# Patient Record
Sex: MALE | Race: WHITE | Employment: FULL TIME | URBAN - METROPOLITAN AREA
[De-identification: names, ages, dates, MRNs, and addresses within clinical notes are randomized per-mention and may not be internally consistent; named-entity substitution may affect disease eponyms.]

---

## 2018-06-21 ENCOUNTER — OFFICE VISIT (OUTPATIENT)
Dept: FAMILY MEDICINE CLINIC | Age: 58
End: 2018-06-21
Payer: COMMERCIAL

## 2018-06-21 VITALS
TEMPERATURE: 97.7 F | WEIGHT: 229.2 LBS | RESPIRATION RATE: 16 BRPM | HEIGHT: 70 IN | BODY MASS INDEX: 32.81 KG/M2 | HEART RATE: 60 BPM | DIASTOLIC BLOOD PRESSURE: 100 MMHG | SYSTOLIC BLOOD PRESSURE: 182 MMHG

## 2018-06-21 DIAGNOSIS — Z12.5 SCREENING PSA (PROSTATE SPECIFIC ANTIGEN): ICD-10-CM

## 2018-06-21 DIAGNOSIS — Z76.89 ESTABLISHING CARE WITH NEW DOCTOR, ENCOUNTER FOR: ICD-10-CM

## 2018-06-21 DIAGNOSIS — I10 HYPERTENSION, UNSPECIFIED TYPE: ICD-10-CM

## 2018-06-21 DIAGNOSIS — Z76.89 ESTABLISHING CARE WITH NEW DOCTOR, ENCOUNTER FOR: Primary | ICD-10-CM

## 2018-06-21 LAB
ALBUMIN SERPL-MCNC: 4.3 G/DL (ref 3.9–4.9)
ALP BLD-CCNC: 94 U/L (ref 35–104)
ALT SERPL-CCNC: 54 U/L (ref 0–41)
ANION GAP SERPL CALCULATED.3IONS-SCNC: 16 MEQ/L (ref 7–13)
AST SERPL-CCNC: 32 U/L (ref 0–40)
BASOPHILS ABSOLUTE: 0.1 K/UL (ref 0–0.2)
BASOPHILS RELATIVE PERCENT: 1.1 %
BILIRUB SERPL-MCNC: 1.2 MG/DL (ref 0–1.2)
BUN BLDV-MCNC: 18 MG/DL (ref 6–20)
CALCIUM SERPL-MCNC: 9.4 MG/DL (ref 8.6–10.2)
CHLORIDE BLD-SCNC: 99 MEQ/L (ref 98–107)
CHOLESTEROL, TOTAL: 199 MG/DL (ref 0–199)
CO2: 26 MEQ/L (ref 22–29)
CREAT SERPL-MCNC: 0.87 MG/DL (ref 0.7–1.2)
EOSINOPHILS ABSOLUTE: 0.4 K/UL (ref 0–0.7)
EOSINOPHILS RELATIVE PERCENT: 5.7 %
GFR AFRICAN AMERICAN: >60
GFR NON-AFRICAN AMERICAN: >60
GLOBULIN: 2.6 G/DL (ref 2.3–3.5)
GLUCOSE BLD-MCNC: 207 MG/DL (ref 74–109)
HCT VFR BLD CALC: 45.8 % (ref 42–52)
HDLC SERPL-MCNC: 35 MG/DL (ref 40–59)
HEMOGLOBIN: 16.1 G/DL (ref 14–18)
LDL CHOLESTEROL CALCULATED: 116 MG/DL (ref 0–129)
LYMPHOCYTES ABSOLUTE: 3 K/UL (ref 1–4.8)
LYMPHOCYTES RELATIVE PERCENT: 39.2 %
MCH RBC QN AUTO: 30.8 PG (ref 27–31.3)
MCHC RBC AUTO-ENTMCNC: 35.1 % (ref 33–37)
MCV RBC AUTO: 87.8 FL (ref 80–100)
MONOCYTES ABSOLUTE: 0.6 K/UL (ref 0.2–0.8)
MONOCYTES RELATIVE PERCENT: 7.9 %
NEUTROPHILS ABSOLUTE: 3.6 K/UL (ref 1.4–6.5)
NEUTROPHILS RELATIVE PERCENT: 46.1 %
PDW BLD-RTO: 13.2 % (ref 11.5–14.5)
PLATELET # BLD: 139 K/UL (ref 130–400)
POTASSIUM SERPL-SCNC: 4 MEQ/L (ref 3.5–5.1)
PROSTATE SPECIFIC ANTIGEN: 0.44 NG/ML (ref 0–3.89)
RBC # BLD: 5.22 M/UL (ref 4.7–6.1)
SODIUM BLD-SCNC: 141 MEQ/L (ref 132–144)
TOTAL PROTEIN: 6.9 G/DL (ref 6.4–8.1)
TRIGL SERPL-MCNC: 242 MG/DL (ref 0–200)
WBC # BLD: 7.7 K/UL (ref 4.8–10.8)

## 2018-06-21 PROCEDURE — 93000 ELECTROCARDIOGRAM COMPLETE: CPT | Performed by: FAMILY MEDICINE

## 2018-06-21 PROCEDURE — G8427 DOCREV CUR MEDS BY ELIG CLIN: HCPCS | Performed by: FAMILY MEDICINE

## 2018-06-21 PROCEDURE — 3017F COLORECTAL CA SCREEN DOC REV: CPT | Performed by: FAMILY MEDICINE

## 2018-06-21 PROCEDURE — 1036F TOBACCO NON-USER: CPT | Performed by: FAMILY MEDICINE

## 2018-06-21 PROCEDURE — 99203 OFFICE O/P NEW LOW 30 MIN: CPT | Performed by: FAMILY MEDICINE

## 2018-06-21 PROCEDURE — G8417 CALC BMI ABV UP PARAM F/U: HCPCS | Performed by: FAMILY MEDICINE

## 2018-06-21 RX ORDER — OLMESARTAN MEDOXOMIL AND HYDROCHLOROTHIAZIDE 20/12.5 20; 12.5 MG/1; MG/1
1 TABLET ORAL DAILY
Qty: 30 TABLET | Refills: 3 | Status: SHIPPED | OUTPATIENT
Start: 2018-06-21 | End: 2018-07-05

## 2018-06-21 RX ORDER — METOPROLOL TARTRATE 50 MG/1
50 TABLET, FILM COATED ORAL 2 TIMES DAILY
Qty: 60 TABLET | Refills: 3 | Status: SHIPPED | OUTPATIENT
Start: 2018-06-21 | End: 2018-10-15 | Stop reason: SDUPTHER

## 2018-06-21 ASSESSMENT — ENCOUNTER SYMPTOMS
CONSTIPATION: 0
EYES NEGATIVE: 1
NAUSEA: 0
SHORTNESS OF BREATH: 0
DIARRHEA: 0
COUGH: 0
ABDOMINAL PAIN: 0

## 2018-06-21 ASSESSMENT — PATIENT HEALTH QUESTIONNAIRE - PHQ9
1. LITTLE INTEREST OR PLEASURE IN DOING THINGS: 0
SUM OF ALL RESPONSES TO PHQ QUESTIONS 1-9: 0
2. FEELING DOWN, DEPRESSED OR HOPELESS: 0
SUM OF ALL RESPONSES TO PHQ9 QUESTIONS 1 & 2: 0

## 2018-06-22 DIAGNOSIS — R73.9 ELEVATED BLOOD SUGAR: Primary | ICD-10-CM

## 2018-06-22 LAB — HBA1C MFR BLD: 8.9 % (ref 4.8–5.9)

## 2018-07-03 ENCOUNTER — OFFICE VISIT (OUTPATIENT)
Dept: FAMILY MEDICINE CLINIC | Age: 58
End: 2018-07-03
Payer: COMMERCIAL

## 2018-07-03 VITALS
DIASTOLIC BLOOD PRESSURE: 88 MMHG | HEIGHT: 70 IN | RESPIRATION RATE: 20 BRPM | BODY MASS INDEX: 32.78 KG/M2 | SYSTOLIC BLOOD PRESSURE: 158 MMHG | WEIGHT: 229 LBS | TEMPERATURE: 96.9 F | HEART RATE: 64 BPM

## 2018-07-03 DIAGNOSIS — L91.8 SKIN TAG: ICD-10-CM

## 2018-07-03 DIAGNOSIS — L91.8 SKIN TAG: Primary | ICD-10-CM

## 2018-07-03 PROCEDURE — 11310 SHAVE SKIN LESION 0.5 CM/<: CPT | Performed by: FAMILY MEDICINE

## 2018-07-03 ASSESSMENT — ENCOUNTER SYMPTOMS
EYE DISCHARGE: 0
SINUS PRESSURE: 0
CHEST TIGHTNESS: 0
EYE ITCHING: 0
SHORTNESS OF BREATH: 0
ABDOMINAL PAIN: 0
SORE THROAT: 0
DIARRHEA: 0
CONSTIPATION: 0

## 2018-07-03 NOTE — PROGRESS NOTES
Subjective  Fifi Fitting, 65 y.o. male presents today with:  Chief Complaint   Patient presents with    Skin Tag     Pt here for forehead skin tag removal. States was told to return after last visit. Pt states tag is not sore or tender, but does itch. HPI    Patient here for lesion removal of the left thigh. No other questions and or concerns for today's visit      Review of Systems   Constitutional: Negative for appetite change, fatigue and fever. HENT: Negative for congestion, ear pain, sinus pressure and sore throat. Eyes: Negative for discharge and itching. Respiratory: Negative for chest tightness and shortness of breath. Cardiovascular: Negative for chest pain and palpitations. Gastrointestinal: Negative for abdominal pain, constipation and diarrhea. Endocrine: Negative for polydipsia. Genitourinary: Negative for difficulty urinating. Musculoskeletal: Negative for gait problem. Skin: Negative. Forehead skin tag   Neurological: Negative for dizziness, weakness, light-headedness and headaches. Hematological: Negative. Psychiatric/Behavioral: Negative. Negative for sleep disturbance. The patient is not nervous/anxious. Past Medical History:   Diagnosis Date    Hypertension      Past Surgical History:   Procedure Laterality Date    ANTERIOR CRUCIATE LIGAMENT REPAIR Left 03/01/1993    CYST REMOVAL  01/01/1983    GALLBLADDER SURGERY  01/01/2010     Social History     Social History    Marital status:      Spouse name: N/A    Number of children: N/A    Years of education: N/A     Occupational History    Not on file.      Social History Main Topics    Smoking status: Never Smoker    Smokeless tobacco: Never Used    Alcohol use No    Drug use: No    Sexual activity: Not on file     Other Topics Concern    Not on file     Social History Narrative    No narrative on file     Family History   Problem Relation Age of Onset    Lung Cancer

## 2018-07-05 ENCOUNTER — OFFICE VISIT (OUTPATIENT)
Dept: FAMILY MEDICINE CLINIC | Age: 58
End: 2018-07-05
Payer: COMMERCIAL

## 2018-07-05 VITALS
HEIGHT: 70 IN | RESPIRATION RATE: 18 BRPM | HEART RATE: 80 BPM | DIASTOLIC BLOOD PRESSURE: 88 MMHG | WEIGHT: 228 LBS | SYSTOLIC BLOOD PRESSURE: 148 MMHG | TEMPERATURE: 97.2 F | BODY MASS INDEX: 32.64 KG/M2

## 2018-07-05 DIAGNOSIS — L91.8 SKIN TAG: Primary | ICD-10-CM

## 2018-07-05 DIAGNOSIS — E11.9 TYPE 2 DIABETES MELLITUS WITHOUT COMPLICATION, WITHOUT LONG-TERM CURRENT USE OF INSULIN (HCC): ICD-10-CM

## 2018-07-05 DIAGNOSIS — I10 HYPERTENSION, UNSPECIFIED TYPE: ICD-10-CM

## 2018-07-05 PROCEDURE — G8427 DOCREV CUR MEDS BY ELIG CLIN: HCPCS | Performed by: FAMILY MEDICINE

## 2018-07-05 PROCEDURE — 1036F TOBACCO NON-USER: CPT | Performed by: FAMILY MEDICINE

## 2018-07-05 PROCEDURE — G8417 CALC BMI ABV UP PARAM F/U: HCPCS | Performed by: FAMILY MEDICINE

## 2018-07-05 PROCEDURE — 2022F DILAT RTA XM EVC RTNOPTHY: CPT | Performed by: FAMILY MEDICINE

## 2018-07-05 PROCEDURE — 99214 OFFICE O/P EST MOD 30 MIN: CPT | Performed by: FAMILY MEDICINE

## 2018-07-05 PROCEDURE — 3045F PR MOST RECENT HEMOGLOBIN A1C LEVEL 7.0-9.0%: CPT | Performed by: FAMILY MEDICINE

## 2018-07-05 PROCEDURE — 3017F COLORECTAL CA SCREEN DOC REV: CPT | Performed by: FAMILY MEDICINE

## 2018-07-05 RX ORDER — OLMESARTAN MEDOXOMIL AND HYDROCHLOROTHIAZIDE 40/12.5 40; 12.5 MG/1; MG/1
1 TABLET ORAL DAILY
Qty: 30 TABLET | Refills: 3 | Status: SHIPPED | OUTPATIENT
Start: 2018-07-05 | End: 2018-10-15 | Stop reason: SDUPTHER

## 2018-07-05 ASSESSMENT — ENCOUNTER SYMPTOMS
SHORTNESS OF BREATH: 0
SINUS PRESSURE: 0
EYE DISCHARGE: 0
CONSTIPATION: 0
ABDOMINAL PAIN: 0
CHEST TIGHTNESS: 0
EYE ITCHING: 0
SORE THROAT: 0
DIARRHEA: 0

## 2018-08-02 ENCOUNTER — OFFICE VISIT (OUTPATIENT)
Dept: FAMILY MEDICINE CLINIC | Age: 58
End: 2018-08-02
Payer: COMMERCIAL

## 2018-08-02 VITALS
WEIGHT: 221 LBS | HEART RATE: 65 BPM | RESPIRATION RATE: 20 BRPM | TEMPERATURE: 97.3 F | HEIGHT: 70 IN | SYSTOLIC BLOOD PRESSURE: 128 MMHG | BODY MASS INDEX: 31.64 KG/M2 | DIASTOLIC BLOOD PRESSURE: 82 MMHG

## 2018-08-02 DIAGNOSIS — E11.8 TYPE 2 DIABETES MELLITUS WITH COMPLICATION, WITHOUT LONG-TERM CURRENT USE OF INSULIN (HCC): ICD-10-CM

## 2018-08-02 DIAGNOSIS — I10 HYPERTENSION, UNSPECIFIED TYPE: Primary | ICD-10-CM

## 2018-08-02 LAB
CREATININE URINE: 229.1 MG/DL
HBA1C MFR BLD: 6.8 %
MICROALBUMIN UR-MCNC: 9.1 MG/DL
MICROALBUMIN/CREAT UR-RTO: 39.7 MG/G (ref 0–30)

## 2018-08-02 PROCEDURE — G8417 CALC BMI ABV UP PARAM F/U: HCPCS | Performed by: FAMILY MEDICINE

## 2018-08-02 PROCEDURE — 2022F DILAT RTA XM EVC RTNOPTHY: CPT | Performed by: FAMILY MEDICINE

## 2018-08-02 PROCEDURE — 1036F TOBACCO NON-USER: CPT | Performed by: FAMILY MEDICINE

## 2018-08-02 PROCEDURE — 83036 HEMOGLOBIN GLYCOSYLATED A1C: CPT | Performed by: FAMILY MEDICINE

## 2018-08-02 PROCEDURE — 99213 OFFICE O/P EST LOW 20 MIN: CPT | Performed by: FAMILY MEDICINE

## 2018-08-02 PROCEDURE — 3044F HG A1C LEVEL LT 7.0%: CPT | Performed by: FAMILY MEDICINE

## 2018-08-02 PROCEDURE — 3017F COLORECTAL CA SCREEN DOC REV: CPT | Performed by: FAMILY MEDICINE

## 2018-08-02 PROCEDURE — G8427 DOCREV CUR MEDS BY ELIG CLIN: HCPCS | Performed by: FAMILY MEDICINE

## 2018-08-02 ASSESSMENT — PATIENT HEALTH QUESTIONNAIRE - PHQ9
SUM OF ALL RESPONSES TO PHQ QUESTIONS 1-9: 0
2. FEELING DOWN, DEPRESSED OR HOPELESS: 0
1. LITTLE INTEREST OR PLEASURE IN DOING THINGS: 0
SUM OF ALL RESPONSES TO PHQ9 QUESTIONS 1 & 2: 0

## 2018-08-02 ASSESSMENT — ENCOUNTER SYMPTOMS
COUGH: 0
SHORTNESS OF BREATH: 0
SINUS PRESSURE: 0
ABDOMINAL PAIN: 0
DIARRHEA: 0
EYE DISCHARGE: 0
EYE ITCHING: 0
CONSTIPATION: 0
SORE THROAT: 0

## 2018-08-02 NOTE — PROGRESS NOTES
Subjective  Asif Mendes, 65 y.o. male presents today with:  Chief Complaint   Patient presents with    Skin Tag     Patient in office being seen for a follow up skin tag removal done 07/03/18, lt upper eyelid. States it healed very well, \" its gone\"    Hypertension     He would like to follow up HTN. Last ov his BP medication was changed to Benicar HCT 40-12.5 mg 1 qd, no negative side effects reported @ this time           HPI    Sherl Cleverly for follow-up blood pressure after changing medications also to look at his eyelid after removal of the skin lesion. No other questions and or concerns for today's visit      Review of Systems   Constitutional: Negative for appetite change, fatigue and fever. HENT: Negative for congestion, ear pain, sinus pressure and sore throat. Eyes: Negative for discharge and itching. Respiratory: Negative for cough and shortness of breath. Cardiovascular: Negative for chest pain and palpitations. Gastrointestinal: Negative for abdominal pain, constipation and diarrhea. Endocrine: Negative for polydipsia. Genitourinary: Negative for difficulty urinating. Musculoskeletal: Negative for gait problem. Skin: Negative. Neurological: Negative for dizziness. Hematological: Negative. Psychiatric/Behavioral: Negative. Past Medical History:   Diagnosis Date    Hypertension     [facility] 4/7/4244     Past Surgical History:   Procedure Laterality Date    ANTERIOR CRUCIATE LIGAMENT REPAIR Left 03/01/1993    CYST REMOVAL  01/01/1983    GALLBLADDER SURGERY  01/01/2010     Social History     Social History    Marital status:      Spouse name: N/A    Number of children: N/A    Years of education: N/A     Occupational History    Not on file.      Social History Main Topics    Smoking status: Never Smoker    Smokeless tobacco: Never Used    Alcohol use No    Drug use: No    Sexual activity: Not on file Other Topics Concern    Not on file     Social History Narrative    No narrative on file     Family History   Problem Relation Age of Onset    Lung Cancer Mother     Heart Disease Father     Dementia Father     Mental Illness Sister      Allergies   Allergen Reactions    Sulfa Antibiotics Rash     Current Outpatient Prescriptions   Medication Sig Dispense Refill    olmesartan-hydrochlorothiazide (BENICAR HCT) 40-12.5 MG per tablet Take 1 tablet by mouth daily 30 tablet 3    metFORMIN (GLUCOPHAGE) 500 MG tablet Take 1 tablet by mouth 2 times daily (with meals) 60 tablet 3    metoprolol tartrate (LOPRESSOR) 50 MG tablet Take 1 tablet by mouth 2 times daily 60 tablet 3     No current facility-administered medications for this visit. PMH, Surgical Hx, Family Hx, and Social Hx reviewed and updated. Health Maintenance reviewed. Objective    Vitals:    08/02/18 0846   BP: 128/82   Pulse: 65   Resp: 20   Temp: 97.3 °F (36.3 °C)   TempSrc: Temporal   Weight: 221 lb (100.2 kg)   Height: 5' 10\" (1.778 m)       Physical Exam   Constitutional: He is oriented to person, place, and time. He appears well-developed and well-nourished. HENT:   Head: Normocephalic and atraumatic. Right Ear: External ear normal.   Left Ear: External ear normal.   Mouth/Throat: Oropharynx is clear and moist.   Eyes: Conjunctivae and EOM are normal. Pupils are equal, round, and reactive to light. Neck: Normal range of motion. Neck supple. No JVD present. No thyromegaly present. Cardiovascular: Normal rate, regular rhythm, normal heart sounds and intact distal pulses. Exam reveals no gallop and no friction rub. No murmur heard. Pulmonary/Chest: Effort normal and breath sounds normal. No respiratory distress. Abdominal: Bowel sounds are normal. He exhibits no mass. There is no tenderness. Musculoskeletal: Normal range of motion. Neurological: He is alert and oriented to person, place, and time.    Skin: Skin is warm

## 2018-10-15 RX ORDER — OLMESARTAN MEDOXOMIL AND HYDROCHLOROTHIAZIDE 40/12.5 40; 12.5 MG/1; MG/1
1 TABLET ORAL DAILY
Qty: 90 TABLET | Refills: 0 | Status: SHIPPED | OUTPATIENT
Start: 2018-10-15 | End: 2019-02-04 | Stop reason: SDUPTHER

## 2018-10-15 RX ORDER — METOPROLOL TARTRATE 50 MG/1
50 TABLET, FILM COATED ORAL 2 TIMES DAILY
Qty: 180 TABLET | Refills: 0 | Status: SHIPPED | OUTPATIENT
Start: 2018-10-15 | End: 2019-02-04 | Stop reason: SDUPTHER

## 2018-11-05 ENCOUNTER — TELEPHONE (OUTPATIENT)
Dept: FAMILY MEDICINE CLINIC | Age: 58
End: 2018-11-05

## 2018-11-06 ENCOUNTER — OFFICE VISIT (OUTPATIENT)
Dept: FAMILY MEDICINE CLINIC | Age: 58
End: 2018-11-06
Payer: COMMERCIAL

## 2018-11-06 VITALS
HEIGHT: 70 IN | WEIGHT: 220.6 LBS | BODY MASS INDEX: 31.58 KG/M2 | TEMPERATURE: 97.9 F | HEART RATE: 58 BPM | SYSTOLIC BLOOD PRESSURE: 138 MMHG | RESPIRATION RATE: 20 BRPM | DIASTOLIC BLOOD PRESSURE: 78 MMHG

## 2018-11-06 DIAGNOSIS — J02.9 SORE THROAT: ICD-10-CM

## 2018-11-06 DIAGNOSIS — R22.1 SWOLLEN THROAT: ICD-10-CM

## 2018-11-06 DIAGNOSIS — R19.7 DIARRHEA, UNSPECIFIED TYPE: Primary | ICD-10-CM

## 2018-11-06 PROCEDURE — G8417 CALC BMI ABV UP PARAM F/U: HCPCS | Performed by: FAMILY MEDICINE

## 2018-11-06 PROCEDURE — 1036F TOBACCO NON-USER: CPT | Performed by: FAMILY MEDICINE

## 2018-11-06 PROCEDURE — G8427 DOCREV CUR MEDS BY ELIG CLIN: HCPCS | Performed by: FAMILY MEDICINE

## 2018-11-06 PROCEDURE — 99213 OFFICE O/P EST LOW 20 MIN: CPT | Performed by: FAMILY MEDICINE

## 2018-11-06 PROCEDURE — G8484 FLU IMMUNIZE NO ADMIN: HCPCS | Performed by: FAMILY MEDICINE

## 2018-11-06 PROCEDURE — 3017F COLORECTAL CA SCREEN DOC REV: CPT | Performed by: FAMILY MEDICINE

## 2018-11-06 ASSESSMENT — ENCOUNTER SYMPTOMS
SHORTNESS OF BREATH: 0
NAUSEA: 0
COUGH: 0
ABDOMINAL PAIN: 1
EYES NEGATIVE: 1
DIARRHEA: 1
CONSTIPATION: 0

## 2019-01-30 ENCOUNTER — OFFICE VISIT (OUTPATIENT)
Dept: FAMILY MEDICINE CLINIC | Age: 59
End: 2019-01-30
Payer: COMMERCIAL

## 2019-01-30 VITALS
WEIGHT: 229 LBS | TEMPERATURE: 96.7 F | SYSTOLIC BLOOD PRESSURE: 156 MMHG | BODY MASS INDEX: 32.78 KG/M2 | DIASTOLIC BLOOD PRESSURE: 96 MMHG | HEIGHT: 70 IN | RESPIRATION RATE: 16 BRPM | HEART RATE: 66 BPM

## 2019-01-30 DIAGNOSIS — M25.561 RIGHT KNEE PAIN, UNSPECIFIED CHRONICITY: ICD-10-CM

## 2019-01-30 DIAGNOSIS — M25.562 LEFT KNEE PAIN, UNSPECIFIED CHRONICITY: ICD-10-CM

## 2019-01-30 DIAGNOSIS — I10 HYPERTENSION, UNSPECIFIED TYPE: Primary | ICD-10-CM

## 2019-01-30 PROCEDURE — 99213 OFFICE O/P EST LOW 20 MIN: CPT | Performed by: FAMILY MEDICINE

## 2019-01-30 PROCEDURE — 20610 DRAIN/INJ JOINT/BURSA W/O US: CPT | Performed by: FAMILY MEDICINE

## 2019-01-30 RX ORDER — TRIAMCINOLONE ACETONIDE 40 MG/ML
80 INJECTION, SUSPENSION INTRA-ARTICULAR; INTRAMUSCULAR ONCE
Status: COMPLETED | OUTPATIENT
Start: 2019-01-30 | End: 2019-01-30

## 2019-01-30 RX ADMIN — TRIAMCINOLONE ACETONIDE 80 MG: 40 INJECTION, SUSPENSION INTRA-ARTICULAR; INTRAMUSCULAR at 10:30

## 2019-01-30 ASSESSMENT — ENCOUNTER SYMPTOMS
SINUS PRESSURE: 0
DIARRHEA: 0
EYE ITCHING: 0
COUGH: 0
SORE THROAT: 0
CONSTIPATION: 0
SHORTNESS OF BREATH: 0
ABDOMINAL PAIN: 0
EYE DISCHARGE: 0

## 2019-01-30 ASSESSMENT — PATIENT HEALTH QUESTIONNAIRE - PHQ9
SUM OF ALL RESPONSES TO PHQ9 QUESTIONS 1 & 2: 0
2. FEELING DOWN, DEPRESSED OR HOPELESS: 0
SUM OF ALL RESPONSES TO PHQ QUESTIONS 1-9: 0
1. LITTLE INTEREST OR PLEASURE IN DOING THINGS: 0
SUM OF ALL RESPONSES TO PHQ QUESTIONS 1-9: 0

## 2019-02-04 DIAGNOSIS — E11.8 TYPE 2 DIABETES MELLITUS WITH COMPLICATION, WITHOUT LONG-TERM CURRENT USE OF INSULIN (HCC): Primary | ICD-10-CM

## 2019-02-04 DIAGNOSIS — I10 HYPERTENSION, UNSPECIFIED TYPE: ICD-10-CM

## 2019-02-04 RX ORDER — METOPROLOL TARTRATE 50 MG/1
TABLET, FILM COATED ORAL
Qty: 60 TABLET | Refills: 0 | Status: SHIPPED | OUTPATIENT
Start: 2019-02-04 | End: 2019-02-15 | Stop reason: SDUPTHER

## 2019-02-04 RX ORDER — OLMESARTAN MEDOXOMIL AND HYDROCHLOROTHIAZIDE 40/12.5 40; 12.5 MG/1; MG/1
TABLET ORAL
Qty: 30 TABLET | Refills: 0 | Status: SHIPPED | OUTPATIENT
Start: 2019-02-04 | End: 2019-02-15 | Stop reason: SDUPTHER

## 2019-02-15 ENCOUNTER — OFFICE VISIT (OUTPATIENT)
Dept: FAMILY MEDICINE CLINIC | Age: 59
End: 2019-02-15
Payer: COMMERCIAL

## 2019-02-15 VITALS
HEART RATE: 70 BPM | RESPIRATION RATE: 12 BRPM | BODY MASS INDEX: 33.39 KG/M2 | WEIGHT: 233.2 LBS | OXYGEN SATURATION: 94 % | HEIGHT: 70 IN | SYSTOLIC BLOOD PRESSURE: 132 MMHG | DIASTOLIC BLOOD PRESSURE: 82 MMHG | TEMPERATURE: 97.4 F

## 2019-02-15 DIAGNOSIS — M25.561 RIGHT KNEE PAIN, UNSPECIFIED CHRONICITY: ICD-10-CM

## 2019-02-15 DIAGNOSIS — I10 HYPERTENSION, UNSPECIFIED TYPE: ICD-10-CM

## 2019-02-15 DIAGNOSIS — M25.562 LEFT KNEE PAIN, UNSPECIFIED CHRONICITY: Primary | ICD-10-CM

## 2019-02-15 PROCEDURE — 20610 DRAIN/INJ JOINT/BURSA W/O US: CPT | Performed by: FAMILY MEDICINE

## 2019-02-15 PROCEDURE — 99999 PR OFFICE/OUTPT VISIT,PROCEDURE ONLY: CPT | Performed by: FAMILY MEDICINE

## 2019-02-15 RX ORDER — METHYLPREDNISOLONE ACETATE 80 MG/ML
80 INJECTION, SUSPENSION INTRA-ARTICULAR; INTRALESIONAL; INTRAMUSCULAR; SOFT TISSUE ONCE
Status: COMPLETED | OUTPATIENT
Start: 2019-02-15 | End: 2019-02-15

## 2019-02-15 RX ORDER — OLMESARTAN MEDOXOMIL AND HYDROCHLOROTHIAZIDE 40/12.5 40; 12.5 MG/1; MG/1
1 TABLET ORAL DAILY
Qty: 30 TABLET | Refills: 0 | Status: SHIPPED | OUTPATIENT
Start: 2019-02-15

## 2019-02-15 RX ORDER — METOPROLOL TARTRATE 50 MG/1
50 TABLET, FILM COATED ORAL 2 TIMES DAILY
Qty: 60 TABLET | Refills: 0 | Status: SHIPPED | OUTPATIENT
Start: 2019-02-15

## 2019-02-15 RX ADMIN — METHYLPREDNISOLONE ACETATE 80 MG: 80 INJECTION, SUSPENSION INTRA-ARTICULAR; INTRALESIONAL; INTRAMUSCULAR; SOFT TISSUE at 11:39

## 2019-02-15 ASSESSMENT — PATIENT HEALTH QUESTIONNAIRE - PHQ9
SUM OF ALL RESPONSES TO PHQ9 QUESTIONS 1 & 2: 0
SUM OF ALL RESPONSES TO PHQ QUESTIONS 1-9: 0
1. LITTLE INTEREST OR PLEASURE IN DOING THINGS: 0
SUM OF ALL RESPONSES TO PHQ QUESTIONS 1-9: 0
DEPRESSION UNABLE TO ASSESS: FUNCTIONAL CAPACITY MOTIVATION LIMITS ACCURACY
2. FEELING DOWN, DEPRESSED OR HOPELESS: 0

## 2019-02-15 ASSESSMENT — ENCOUNTER SYMPTOMS
SORE THROAT: 0
DIARRHEA: 0
CONSTIPATION: 0
SHORTNESS OF BREATH: 0
EYE DISCHARGE: 0
SINUS PRESSURE: 0
COUGH: 0
EYE ITCHING: 0
ABDOMINAL PAIN: 0

## 2019-03-15 ENCOUNTER — TELEPHONE (OUTPATIENT)
Dept: OTHER | Facility: CLINIC | Age: 59
End: 2019-03-15

## 2023-03-15 DIAGNOSIS — E11.9 TYPE 2 DIABETES MELLITUS WITHOUT COMPLICATION, WITHOUT LONG-TERM CURRENT USE OF INSULIN (MULTI): ICD-10-CM

## 2023-03-15 DIAGNOSIS — M19.90 OSTEOARTHRITIS, UNSPECIFIED OSTEOARTHRITIS TYPE, UNSPECIFIED SITE: ICD-10-CM

## 2023-03-15 RX ORDER — PIOGLITAZONEHYDROCHLORIDE 30 MG/1
30 TABLET ORAL DAILY
Qty: 90 TABLET | Refills: 0 | Status: SHIPPED | OUTPATIENT
Start: 2023-03-15 | End: 2023-06-15 | Stop reason: SDUPTHER

## 2023-03-15 RX ORDER — MELOXICAM 15 MG/1
15 TABLET ORAL DAILY
Qty: 90 TABLET | Refills: 0 | Status: SHIPPED | OUTPATIENT
Start: 2023-03-15 | End: 2023-10-31

## 2023-03-15 NOTE — TELEPHONE ENCOUNTER
PATIENT HAS 30 DAY SUPPLY FOR HIS MELOXICAM BUT INSURANCE REQUIRES 90 DAY AND IS WANTING TO KNOW IF 90 DAY SUPPLY CAN BE SENT TO THE PHARMACY.    Rx Refill Request Telephone Encounter    Name:  Neo Ordoñez  : 1960     Medication Name:  ACTOS  Dose (Optional):    30 MG  Quantity (Optional):    90  Directions (Optional):   TAKE 1 DAILY    Medication Name:  MELOXICAM  Dose (Optional):    15 MG  Quantity (Optional):    90  Directions (Optional):   1 DAILY    ALLERGIES:   SULFA    Specific Pharmacy location:  [facility]    Date of last appointment:  2023  Date of next appointment:  NONE  Best number to reach patient:  [phone] (home)

## 2023-03-22 DIAGNOSIS — I10 HYPERTENSION, UNSPECIFIED TYPE: ICD-10-CM

## 2023-03-22 RX ORDER — METOPROLOL TARTRATE 50 MG/1
50 TABLET ORAL 2 TIMES DAILY
Qty: 180 TABLET | Refills: 0 | Status: SHIPPED | OUTPATIENT
Start: 2023-03-22 | End: 2024-02-21 | Stop reason: SDUPTHER

## 2023-03-22 RX ORDER — METOPROLOL TARTRATE 50 MG/1
50 TABLET ORAL 2 TIMES DAILY
COMMUNITY
End: 2023-03-22 | Stop reason: SDUPTHER

## 2023-06-15 DIAGNOSIS — E11.9 TYPE 2 DIABETES MELLITUS WITHOUT COMPLICATION, WITHOUT LONG-TERM CURRENT USE OF INSULIN (MULTI): ICD-10-CM

## 2023-06-15 RX ORDER — PIOGLITAZONEHYDROCHLORIDE 30 MG/1
30 TABLET ORAL DAILY
Qty: 90 TABLET | Refills: 0 | Status: SHIPPED | OUTPATIENT
Start: 2023-06-15 | End: 2023-08-31

## 2023-06-15 NOTE — TELEPHONE ENCOUNTER
Pt aware you are out of the office today    Rx Refill Request Telephone Encounter    Name:  Neo Ordoñez  : 1960     Medication Name:  Piogliatazone  Dose (Optional):    30mg  Quantity (Optional):    #90  Directions (Optional):   Take 1 tablet (30mg) by mouth once daily    ALLERGIES:   sulfa  sulfasalazine    Specific Pharmacy location:  [facility]    Date of last appointment:  23  Date of next appointment:  23    Best number to reach patient:  [phone]

## 2023-07-07 ENCOUNTER — HOSPITAL ENCOUNTER (OUTPATIENT)
Dept: DATA CONVERSION | Facility: HOSPITAL | Age: 63
End: 2023-07-12
Attending: STUDENT IN AN ORGANIZED HEALTH CARE EDUCATION/TRAINING PROGRAM
Payer: COMMERCIAL

## 2023-07-08 LAB
ANION GAP IN SER/PLAS: 11 MMOL/L (ref 10–20)
CALCIUM (MG/DL) IN SER/PLAS: 8.3 MG/DL (ref 8.6–10.3)
CARBON DIOXIDE, TOTAL (MMOL/L) IN SER/PLAS: 28 MMOL/L (ref 21–32)
CHLORIDE (MMOL/L) IN SER/PLAS: 104 MMOL/L (ref 98–107)
CREATININE (MG/DL) IN SER/PLAS: 0.89 MG/DL (ref 0.5–1.3)
ERYTHROCYTE DISTRIBUTION WIDTH (RATIO) BY AUTOMATED COUNT: 12.8 % (ref 11.5–14.5)
ERYTHROCYTE DISTRIBUTION WIDTH (RATIO) BY AUTOMATED COUNT: 12.8 % (ref 11.5–14.5)
ERYTHROCYTE MEAN CORPUSCULAR HEMOGLOBIN CONCENTRATION (G/DL) BY AUTOMATED: 34.2 G/DL (ref 32–36)
ERYTHROCYTE MEAN CORPUSCULAR HEMOGLOBIN CONCENTRATION (G/DL) BY AUTOMATED: 34.2 G/DL (ref 32–36)
ERYTHROCYTE MEAN CORPUSCULAR VOLUME (FL) BY AUTOMATED COUNT: 90 FL (ref 80–100)
ERYTHROCYTE MEAN CORPUSCULAR VOLUME (FL) BY AUTOMATED COUNT: 90 FL (ref 80–100)
ERYTHROCYTES (10*6/UL) IN BLOOD BY AUTOMATED COUNT: 3.29 X10E12/L (ref 4.5–5.9)
ERYTHROCYTES (10*6/UL) IN BLOOD BY AUTOMATED COUNT: 3.29 X10E12/L (ref 4.5–5.9)
GFR MALE: >90 ML/MIN/1.73M2
GLUCOSE (MG/DL) IN SER/PLAS: 98 MG/DL (ref 74–99)
HEMATOCRIT (%) IN BLOOD BY AUTOMATED COUNT: 29.5 % (ref 41–52)
HEMATOCRIT (%) IN BLOOD BY AUTOMATED COUNT: 29.5 % (ref 41–52)
HEMOGLOBIN (G/DL) IN BLOOD: 10.1 G/DL (ref 13.5–17.5)
HEMOGLOBIN (G/DL) IN BLOOD: 10.1 G/DL (ref 13.5–17.5)
LEUKOCYTES (10*3/UL) IN BLOOD BY AUTOMATED COUNT: 5.7 X10E9/L (ref 4.4–11.3)
LEUKOCYTES (10*3/UL) IN BLOOD BY AUTOMATED COUNT: 5.7 X10E9/L (ref 4.4–11.3)
PLATELETS (10*3/UL) IN BLOOD AUTOMATED COUNT: 173 X10E9/L (ref 150–450)
PLATELETS (10*3/UL) IN BLOOD AUTOMATED COUNT: 173 X10E9/L (ref 150–450)
POTASSIUM (MMOL/L) IN SER/PLAS: 3.5 MMOL/L (ref 3.5–5.3)
SODIUM (MMOL/L) IN SER/PLAS: 139 MMOL/L (ref 136–145)
UREA NITROGEN (MG/DL) IN SER/PLAS: 12 MG/DL (ref 6–23)

## 2023-07-09 LAB
BASOPHILS (10*3/UL) IN BLOOD BY AUTOMATED COUNT: 0.05 X10E9/L (ref 0–0.1)
BASOPHILS/100 LEUKOCYTES IN BLOOD BY AUTOMATED COUNT: 0.7 % (ref 0–2)
EOSINOPHILS (10*3/UL) IN BLOOD BY AUTOMATED COUNT: 0.27 X10E9/L (ref 0–0.7)
EOSINOPHILS/100 LEUKOCYTES IN BLOOD BY AUTOMATED COUNT: 3.8 % (ref 0–6)
ERYTHROCYTE DISTRIBUTION WIDTH (RATIO) BY AUTOMATED COUNT: 12.9 % (ref 11.5–14.5)
ERYTHROCYTE MEAN CORPUSCULAR HEMOGLOBIN CONCENTRATION (G/DL) BY AUTOMATED: 33.3 G/DL (ref 32–36)
ERYTHROCYTE MEAN CORPUSCULAR VOLUME (FL) BY AUTOMATED COUNT: 92 FL (ref 80–100)
ERYTHROCYTES (10*6/UL) IN BLOOD BY AUTOMATED COUNT: 3.44 X10E12/L (ref 4.5–5.9)
HEMATOCRIT (%) IN BLOOD BY AUTOMATED COUNT: 31.5 % (ref 41–52)
HEMOGLOBIN (G/DL) IN BLOOD: 10.5 G/DL (ref 13.5–17.5)
IMMATURE GRANULOCYTES/100 LEUKOCYTES IN BLOOD BY AUTOMATED COUNT: 1 % (ref 0–0.9)
LEUKOCYTES (10*3/UL) IN BLOOD BY AUTOMATED COUNT: 7.2 X10E9/L (ref 4.4–11.3)
LYMPHOCYTES (10*3/UL) IN BLOOD BY AUTOMATED COUNT: 2.25 X10E9/L (ref 1.2–4.8)
LYMPHOCYTES/100 LEUKOCYTES IN BLOOD BY AUTOMATED COUNT: 31.4 % (ref 13–44)
MONOCYTES (10*3/UL) IN BLOOD BY AUTOMATED COUNT: 0.8 X10E9/L (ref 0.1–1)
MONOCYTES/100 LEUKOCYTES IN BLOOD BY AUTOMATED COUNT: 11.2 % (ref 2–10)
NEUTROPHILS (10*3/UL) IN BLOOD BY AUTOMATED COUNT: 3.73 X10E9/L (ref 1.2–7.7)
NEUTROPHILS/100 LEUKOCYTES IN BLOOD BY AUTOMATED COUNT: 51.9 % (ref 40–80)
PLATELETS (10*3/UL) IN BLOOD AUTOMATED COUNT: 228 X10E9/L (ref 150–450)
SEDIMENTATION RATE, ERYTHROCYTE: 29 MM/H (ref 0–20)

## 2023-07-10 LAB
ANION GAP IN SER/PLAS: 11 MMOL/L (ref 10–20)
CALCIUM (MG/DL) IN SER/PLAS: 8.2 MG/DL (ref 8.6–10.3)
CARBON DIOXIDE, TOTAL (MMOL/L) IN SER/PLAS: 28 MMOL/L (ref 21–32)
CHLORIDE (MMOL/L) IN SER/PLAS: 104 MMOL/L (ref 98–107)
CREATININE (MG/DL) IN SER/PLAS: 0.95 MG/DL (ref 0.5–1.3)
GFR MALE: >90 ML/MIN/1.73M2
GLUCOSE (MG/DL) IN SER/PLAS: 111 MG/DL (ref 74–99)
POTASSIUM (MMOL/L) IN SER/PLAS: 3.4 MMOL/L (ref 3.5–5.3)
SODIUM (MMOL/L) IN SER/PLAS: 140 MMOL/L (ref 136–145)
UREA NITROGEN (MG/DL) IN SER/PLAS: 13 MG/DL (ref 6–23)

## 2023-07-11 LAB
APPEARANCE, URINE: CLEAR
BASOPHILS (10*3/UL) IN BLOOD BY AUTOMATED COUNT: 0.06 X10E9/L (ref 0–0.1)
BASOPHILS/100 LEUKOCYTES IN BLOOD BY AUTOMATED COUNT: 0.8 % (ref 0–2)
BILIRUBIN, URINE: NEGATIVE
BLOOD, URINE: NEGATIVE
COLOR, URINE: ABNORMAL
EOSINOPHILS (10*3/UL) IN BLOOD BY AUTOMATED COUNT: 0.33 X10E9/L (ref 0–0.7)
EOSINOPHILS/100 LEUKOCYTES IN BLOOD BY AUTOMATED COUNT: 4.4 % (ref 0–6)
ERYTHROCYTE DISTRIBUTION WIDTH (RATIO) BY AUTOMATED COUNT: 12.8 % (ref 11.5–14.5)
ERYTHROCYTE MEAN CORPUSCULAR HEMOGLOBIN CONCENTRATION (G/DL) BY AUTOMATED: 34 G/DL (ref 32–36)
ERYTHROCYTE MEAN CORPUSCULAR VOLUME (FL) BY AUTOMATED COUNT: 89 FL (ref 80–100)
ERYTHROCYTES (10*6/UL) IN BLOOD BY AUTOMATED COUNT: 3.29 X10E12/L (ref 4.5–5.9)
GLUCOSE, URINE: ABNORMAL MG/DL
HEMATOCRIT (%) IN BLOOD BY AUTOMATED COUNT: 29.4 % (ref 41–52)
HEMOGLOBIN (G/DL) IN BLOOD: 10 G/DL (ref 13.5–17.5)
IMMATURE GRANULOCYTES/100 LEUKOCYTES IN BLOOD BY AUTOMATED COUNT: 0.8 % (ref 0–0.9)
KETONES, URINE: NEGATIVE MG/DL
LEUKOCYTE ESTERASE, URINE: NEGATIVE
LEUKOCYTES (10*3/UL) IN BLOOD BY AUTOMATED COUNT: 7.6 X10E9/L (ref 4.4–11.3)
LYMPHOCYTES (10*3/UL) IN BLOOD BY AUTOMATED COUNT: 1.95 X10E9/L (ref 1.2–4.8)
LYMPHOCYTES/100 LEUKOCYTES IN BLOOD BY AUTOMATED COUNT: 25.8 % (ref 13–44)
MONOCYTES (10*3/UL) IN BLOOD BY AUTOMATED COUNT: 0.79 X10E9/L (ref 0.1–1)
MONOCYTES/100 LEUKOCYTES IN BLOOD BY AUTOMATED COUNT: 10.4 % (ref 2–10)
NEUTROPHILS (10*3/UL) IN BLOOD BY AUTOMATED COUNT: 4.38 X10E9/L (ref 1.2–7.7)
NEUTROPHILS/100 LEUKOCYTES IN BLOOD BY AUTOMATED COUNT: 57.8 % (ref 40–80)
NITRITE, URINE: NEGATIVE
PH, URINE: 6 (ref 5–8)
PLATELETS (10*3/UL) IN BLOOD AUTOMATED COUNT: 237 X10E9/L (ref 150–450)
PROTEIN, URINE: NEGATIVE MG/DL
SPECIFIC GRAVITY, URINE: 1.01 (ref 1–1.03)
UROBILINOGEN, URINE: <2 MG/DL (ref 0–1.9)

## 2023-07-12 LAB
ANION GAP IN SER/PLAS: 13 MMOL/L (ref 10–20)
BASOPHILS (10*3/UL) IN BLOOD BY AUTOMATED COUNT: 0.04 X10E9/L (ref 0–0.1)
BASOPHILS/100 LEUKOCYTES IN BLOOD BY AUTOMATED COUNT: 0.6 % (ref 0–2)
CALCIUM (MG/DL) IN SER/PLAS: 8.1 MG/DL (ref 8.6–10.3)
CARBON DIOXIDE, TOTAL (MMOL/L) IN SER/PLAS: 26 MMOL/L (ref 21–32)
CHLORIDE (MMOL/L) IN SER/PLAS: 106 MMOL/L (ref 98–107)
CREATININE (MG/DL) IN SER/PLAS: 0.95 MG/DL (ref 0.5–1.3)
EOSINOPHILS (10*3/UL) IN BLOOD BY AUTOMATED COUNT: 0.32 X10E9/L (ref 0–0.7)
EOSINOPHILS/100 LEUKOCYTES IN BLOOD BY AUTOMATED COUNT: 4.5 % (ref 0–6)
ERYTHROCYTE DISTRIBUTION WIDTH (RATIO) BY AUTOMATED COUNT: 13 % (ref 11.5–14.5)
ERYTHROCYTE MEAN CORPUSCULAR HEMOGLOBIN CONCENTRATION (G/DL) BY AUTOMATED: 33.7 G/DL (ref 32–36)
ERYTHROCYTE MEAN CORPUSCULAR VOLUME (FL) BY AUTOMATED COUNT: 90 FL (ref 80–100)
ERYTHROCYTES (10*6/UL) IN BLOOD BY AUTOMATED COUNT: 3.26 X10E12/L (ref 4.5–5.9)
GFR MALE: >90 ML/MIN/1.73M2
GLUCOSE (MG/DL) IN SER/PLAS: 95 MG/DL (ref 74–99)
HEMATOCRIT (%) IN BLOOD BY AUTOMATED COUNT: 29.4 % (ref 41–52)
HEMOGLOBIN (G/DL) IN BLOOD: 9.9 G/DL (ref 13.5–17.5)
IMMATURE GRANULOCYTES/100 LEUKOCYTES IN BLOOD BY AUTOMATED COUNT: 0.8 % (ref 0–0.9)
LEUKOCYTES (10*3/UL) IN BLOOD BY AUTOMATED COUNT: 7.2 X10E9/L (ref 4.4–11.3)
LYMPHOCYTES (10*3/UL) IN BLOOD BY AUTOMATED COUNT: 2.13 X10E9/L (ref 1.2–4.8)
LYMPHOCYTES/100 LEUKOCYTES IN BLOOD BY AUTOMATED COUNT: 29.7 % (ref 13–44)
MONOCYTES (10*3/UL) IN BLOOD BY AUTOMATED COUNT: 0.84 X10E9/L (ref 0.1–1)
MONOCYTES/100 LEUKOCYTES IN BLOOD BY AUTOMATED COUNT: 11.7 % (ref 2–10)
NEUTROPHILS (10*3/UL) IN BLOOD BY AUTOMATED COUNT: 3.77 X10E9/L (ref 1.2–7.7)
NEUTROPHILS/100 LEUKOCYTES IN BLOOD BY AUTOMATED COUNT: 52.7 % (ref 40–80)
PLATELETS (10*3/UL) IN BLOOD AUTOMATED COUNT: 225 X10E9/L (ref 150–450)
POTASSIUM (MMOL/L) IN SER/PLAS: 3.8 MMOL/L (ref 3.5–5.3)
SODIUM (MMOL/L) IN SER/PLAS: 141 MMOL/L (ref 136–145)
UREA NITROGEN (MG/DL) IN SER/PLAS: 16 MG/DL (ref 6–23)

## 2023-07-13 LAB — GRAM STAIN: NORMAL

## 2023-07-15 LAB — TISSUE/WOUND CULTURE/SMEAR: NORMAL

## 2023-07-16 LAB — BLOOD CULTURE: NORMAL

## 2023-07-19 ENCOUNTER — PATIENT OUTREACH (OUTPATIENT)
Dept: PRIMARY CARE | Facility: CLINIC | Age: 63
End: 2023-07-19
Payer: COMMERCIAL

## 2023-07-19 RX ORDER — POLYETHYLENE GLYCOL 3350, SODIUM SULFATE ANHYDROUS, SODIUM BICARBONATE, SODIUM CHLORIDE, POTASSIUM CHLORIDE 236; 22.74; 6.74; 5.86; 2.97 G/4L; G/4L; G/4L; G/4L; G/4L
17 POWDER, FOR SOLUTION ORAL 2 TIMES DAILY
COMMUNITY

## 2023-07-19 RX ORDER — AMLODIPINE BESYLATE 10 MG/1
1 TABLET ORAL DAILY
COMMUNITY
Start: 2021-01-12 | End: 2023-09-21 | Stop reason: SDUPTHER

## 2023-07-19 RX ORDER — OXYCODONE HYDROCHLORIDE 5 MG/1
TABLET ORAL EVERY 6 HOURS PRN
COMMUNITY
Start: 2023-07-18 | End: 2023-07-22

## 2023-07-19 RX ORDER — ASPIRIN 81 MG/1
TABLET ORAL 2 TIMES DAILY
COMMUNITY
Start: 2023-07-05 | End: 2023-11-03 | Stop reason: ALTCHOICE

## 2023-07-19 RX ORDER — DOCUSATE SODIUM 100 MG/1
CAPSULE, LIQUID FILLED ORAL 2 TIMES DAILY
COMMUNITY
Start: 2023-07-05

## 2023-07-19 RX ORDER — DOXYCYCLINE 100 MG/1
CAPSULE ORAL EVERY 12 HOURS
COMMUNITY
Start: 2023-07-18 | End: 2023-07-30

## 2023-07-19 RX ORDER — FERROUS SULFATE 325(65) MG
1 TABLET, DELAYED RELEASE (ENTERIC COATED) ORAL DAILY
Qty: 29 TABLET | Refills: 0 | COMMUNITY
Start: 2023-07-07 | End: 2023-08-01 | Stop reason: SDUPTHER

## 2023-07-19 RX ORDER — DAPAGLIFLOZIN 10 MG/1
1 TABLET, FILM COATED ORAL DAILY
COMMUNITY
Start: 2021-05-07 | End: 2023-08-01 | Stop reason: SDUPTHER

## 2023-07-19 RX ORDER — HYDROCHLOROTHIAZIDE 25 MG/1
12.5 TABLET ORAL DAILY
COMMUNITY
End: 2023-11-20 | Stop reason: ALTCHOICE

## 2023-07-19 RX ORDER — LOSARTAN POTASSIUM 50 MG/1
50 TABLET ORAL DAILY
COMMUNITY
End: 2023-11-20 | Stop reason: ALTCHOICE

## 2023-07-19 RX ORDER — AMOXICILLIN AND CLAVULANATE POTASSIUM 875; 125 MG/1; MG/1
TABLET, FILM COATED ORAL EVERY 12 HOURS
COMMUNITY
Start: 2023-07-18 | End: 2023-07-30

## 2023-07-19 RX ORDER — SEMAGLUTIDE 0.68 MG/ML
INJECTION, SOLUTION SUBCUTANEOUS
COMMUNITY
Start: 2023-05-25 | End: 2023-11-20 | Stop reason: DRUGHIGH

## 2023-07-19 NOTE — PROGRESS NOTES
Discharge Facility: [facility]  Discharge Diagnosis: Cellulitis of right lower leg, Post-operative pain  Admission Date: 12-Jul-2023  Discharge Date: 18-Jul-2023    PCP Appointment Date: 7/21/2023   Specialist Appointment Date: 7/20/2023 with Dr. Reyes at [facility]  Hospital Encounter and Summary: not available at this time   See discharge assessment below for further details  Engagement  Call Start Time: 1204 (7/19/2023 12:12 PM)    Medications  Medications reviewed with patient/caregiver?: Yes (new meds discussed) (7/19/2023 12:12 PM)  Is the patient having any side effects they believe may be caused by any medication additions or changes?: No (7/19/2023 12:12 PM)  Does the patient have all medications ordered at discharge?: Yes (7/19/2023 12:12 PM)  Prescription Comments: see med list (augmentin, doxycycline, oxycodone) (7/19/2023 12:12 PM)  Is the patient taking all medications as directed (includes completed medication regime)?: Yes (7/19/2023 12:12 PM)  Care Management Interventions: Provided patient education (7/19/2023 12:12 PM)    Appointments  Does the patient have a primary care provider?: Yes (7/19/2023 12:12 PM)  Care Management Interventions: Verified appointment date/time/provider (7/19/2023 12:12 PM)  Has the patient kept scheduled appointments due by today?: Yes (7/19/2023 12:12 PM)  Care Management Interventions: Advised patient to keep appointment (7/19/2023 12:12 PM)    Self Management  What is the home health agency?: [facility] (7/19/2023 12:12 PM)  Has home health visited the patient within 72 hours of discharge?: Not applicable (< 72 hours-patient has [facility] number and mine if there are issues with start of care) (7/19/2023 12:12 PM)  What Durable Medical Equipment (DME) was ordered?: wound vaac (7/19/2023 12:12 PM)  Has all Durable Medical Equipment (DME) been delivered?: Yes (7/19/2023 12:12 PM)    Patient Teaching  Does the patient have access to their discharge instructions?:  "Yes (7/19/2023 12:12 PM)  Care Management Interventions: Reviewed instructions with patient (7/19/2023 12:12 PM)  What is the patient's perception of their health status since discharge?: Improving (7/19/2023 12:12 PM)  Is the patient/caregiver able to teach back the hierarchy of who to call/visit for symptoms/problems? PCP, Specialist, Home Health nurse, Urgent Care, ED, 911: Yes (7/19/2023 12:12 PM)  Patient/Caregiver Education Comments: Patient states he is tolerating pain at this time but states prior to DC they were giving him 10 mg Oxycodone q 4 hrs and Dilaudid for breakthrough pain. States he has been taking the Oxycodone 5 mg q 8 hrs but is worried about pain with dressing changes as those are still \"very painful\" for him to have to go through. Advised to speak to Dr. Reyes about this at tomorrow's appt when next dressing change will be due. 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 (7/19/2023 12:12 PM)        "

## 2023-07-21 ENCOUNTER — OFFICE VISIT (OUTPATIENT)
Dept: PRIMARY CARE | Facility: CLINIC | Age: 63
End: 2023-07-21
Payer: COMMERCIAL

## 2023-07-21 VITALS
BODY MASS INDEX: 30.96 KG/M2 | TEMPERATURE: 97.3 F | OXYGEN SATURATION: 97 % | DIASTOLIC BLOOD PRESSURE: 64 MMHG | WEIGHT: 209 LBS | SYSTOLIC BLOOD PRESSURE: 110 MMHG | HEIGHT: 69 IN | HEART RATE: 76 BPM

## 2023-07-21 DIAGNOSIS — I10 HYPERTENSION, UNSPECIFIED TYPE: ICD-10-CM

## 2023-07-21 PROBLEM — N52.9 ERECTILE DYSFUNCTION: Status: ACTIVE | Noted: 2023-07-21

## 2023-07-21 PROBLEM — N40.1 BENIGN PROSTATIC HYPERPLASIA WITH URINARY HESITANCY: Status: ACTIVE | Noted: 2023-07-21

## 2023-07-21 PROBLEM — T79.A0XA COMPARTMENT SYNDROME (CMS-HCC): Status: ACTIVE | Noted: 2023-07-21

## 2023-07-21 PROBLEM — L03.90 CELLULITIS: Status: ACTIVE | Noted: 2023-07-21

## 2023-07-21 PROBLEM — R39.11 BENIGN PROSTATIC HYPERPLASIA WITH URINARY HESITANCY: Status: ACTIVE | Noted: 2023-07-21

## 2023-07-21 PROBLEM — M54.50 LOWER BACK PAIN: Status: ACTIVE | Noted: 2023-07-21

## 2023-07-21 PROBLEM — R79.89 ELEVATED LFTS: Status: ACTIVE | Noted: 2023-07-21

## 2023-07-21 PROBLEM — M79.89 SWELLING OF LOWER EXTREMITY: Status: ACTIVE | Noted: 2023-07-03

## 2023-07-21 PROBLEM — E78.5 HYPERLIPIDEMIA: Status: ACTIVE | Noted: 2023-07-21

## 2023-07-21 PROBLEM — E11.8 TYPE 2 DIABETES MELLITUS WITH COMPLICATION (MULTI): Status: ACTIVE | Noted: 2018-08-02

## 2023-07-21 PROBLEM — M79.604 LEG PAIN, RIGHT: Status: ACTIVE | Noted: 2023-07-03

## 2023-07-21 PROBLEM — G89.18 POST-OPERATIVE PAIN: Status: ACTIVE | Noted: 2023-07-21

## 2023-07-21 PROBLEM — L03.115 CELLULITIS OF RIGHT LOWER LEG: Status: ACTIVE | Noted: 2023-07-13

## 2023-07-21 PROBLEM — S61.409A OPEN WOUND, HAND: Status: ACTIVE | Noted: 2023-07-21

## 2023-07-21 PROBLEM — Z98.890: Status: ACTIVE | Noted: 2023-07-16

## 2023-07-21 PROBLEM — S81.801A LEG WOUND, RIGHT: Status: ACTIVE | Noted: 2023-07-05

## 2023-07-21 PROBLEM — E11.65 TYPE 2 DIABETES MELLITUS WITH HYPERGLYCEMIA, WITHOUT LONG-TERM CURRENT USE OF INSULIN (MULTI): Status: ACTIVE | Noted: 2023-07-21

## 2023-07-21 PROCEDURE — 1036F TOBACCO NON-USER: CPT | Performed by: FAMILY MEDICINE

## 2023-07-21 PROCEDURE — 4010F ACE/ARB THERAPY RXD/TAKEN: CPT | Performed by: FAMILY MEDICINE

## 2023-07-21 PROCEDURE — 3008F BODY MASS INDEX DOCD: CPT | Performed by: FAMILY MEDICINE

## 2023-07-21 PROCEDURE — 3078F DIAST BP <80 MM HG: CPT | Performed by: FAMILY MEDICINE

## 2023-07-21 PROCEDURE — 3074F SYST BP LT 130 MM HG: CPT | Performed by: FAMILY MEDICINE

## 2023-07-21 PROCEDURE — 99213 OFFICE O/P EST LOW 20 MIN: CPT | Performed by: FAMILY MEDICINE

## 2023-07-21 RX ORDER — OLMESARTAN MEDOXOMIL AND HYDROCHLOROTHIAZIDE 40/12.5 40; 12.5 MG/1; MG/1
1 TABLET ORAL DAILY
Qty: 90 TABLET | Refills: 1 | Status: SHIPPED | OUTPATIENT
Start: 2023-07-21 | End: 2024-01-18

## 2023-07-21 RX ORDER — OLMESARTAN MEDOXOMIL AND HYDROCHLOROTHIAZIDE 40/12.5 40; 12.5 MG/1; MG/1
1 TABLET ORAL DAILY
COMMUNITY
Start: 2019-02-15 | End: 2023-07-21 | Stop reason: SDUPTHER

## 2023-07-21 NOTE — PROGRESS NOTES
Subjective   Patient ID: Neo Ordoñez is a 62 y.o. male who presents for Hospital Follow-up.  [facility]    Hospital follow up   MARCELLO complete   Admitted to [facility]   Admission dates 07/12/2023--07/18/2023  Admitted for  Cellulitis right lower leg, post op pain    3  Days since discharge  Patient had BW, Venous duplex,    Patient advised to follow up with the wound center for 1 dressing change per week  Patient has a [facility] nurse twice weekly for changes     Patient was prescribed Doxycycline, Augmentin, Oxycodone, Iron  Patient was given Losartan and hydrochlorothiazide in the hospital   Normally prescribed Olmesartan-hydrochlorothiazide    Would like to discuss different pain medication for his dressing changes.                   Review of Systems  Constitutional:  no chills, no fever and no night sweats.  Eyes: no blurred vision and no eyesight problems.  ENT: no hearing loss, no nasal congestion, no hoarseness and no sore throat.  Neck: no mass (es) and no swelling.  Cardiovascular: no chest pain, no intermittent leg claudication, no lower extremity edema, no palpitation and no syncope.  Respiratory: no cough, no shortness of breath during exertion, no shortness of breath at rest and no wheezing.  Gastrointestinal: no abdominal pain, no blood in stools, no constipation, no diarrhea, no melena, no nausea, no rectal pain and no vomiting.  Genitourinary: no dysuria, no change in urinary frequency, no urinary hesitancy and no feelings of urinary urgency.  Musculoskeletal: no arthralgias, no back pain and no myalgias.  Integumentary: no new skin lesions and no rashes.  Neurological: no difficulty walking, no headache, no limb weakness, no numbness and no tingling.  Psychiatric/Behavioral: no anxiety, no depression, no anhedonia and no substance use disorders.  Endocrine: no recent weight gain and no recent weight loss.  Hematologic/Lymphatic: no tendency for easy bruising and no swollen glands    Objective   Physical  "Exam  Patient in for follow-up admitted [facility] nontraumatic compartment syndrome unknown etiology he has a large wound on the right leg medially wound VAC in place being seen at the wound care center weekly and has home health for dressing changes twice a week pain is controlled during the week increased discomfort during the dressing changes but otherwise doing fine.  Lungs clear cardiac and abdominal exams benign blood pressure is good he needs refill on his blood pressure medication recent lab work done at the hospital stable.  Physical exam today's office visit constitutional alert and oriented x3.    Head is atraumatic HEENT is within normal limits.    Neck supple no masses full range of motion.    Thyroid is normal in size no thyromegaly there is no carotid bruits.    Pulmonary exam shows clear to auscultation no respiratory distress.    Cardiovascular shows no murmur rub or gallop.  Regular rate and rhythm.    Abdominal exam soft nontender no hepatosplenomegaly or masses normal bowel sounds no rebound no guarding.    Musculoskeletal exam no joint pain no muscle pain full range of motion.    Psych exam normal mood and affect.    Dermatologic exam no skin lesions no rash no blemishes.    Neuro exam is no focal deficits.  Normal exam.    Extremities no edema normal pulses normal capillary refill.    /64   Pulse 76   Temp 36.3 °C (97.3 °F) (Temporal)   Ht 1.753 m (5' 9\")   Wt 94.8 kg (209 lb)   SpO2 97%   BMI 30.86 kg/m²     Lab Results   Component Value Date    WBC 6.3 07/18/2023    HGB 11.0 (L) 07/18/2023    HCT 34.3 (L) 07/18/2023    MCV 91 07/18/2023     07/18/2023       Assessment/Plan plan is to continue current treatment course and follow-up routinely in 1 month or as needed  Problem List Items Addressed This Visit    None      "

## 2023-07-22 LAB
GRAM STAIN: NORMAL
TISSUE/WOUND CULTURE/SMEAR: NORMAL

## 2023-07-27 ENCOUNTER — PATIENT OUTREACH (OUTPATIENT)
Dept: PRIMARY CARE | Facility: CLINIC | Age: 63
End: 2023-07-27
Payer: COMMERCIAL

## 2023-07-27 NOTE — PROGRESS NOTES
Unable to reach patient for call back after patient's follow up appointment with PCP.   YESENIAM with call back number for patient to call if needed   If no voicemail available call attempts x 2 were made to contact the patient to assist with any questions or concerns patient may have.

## 2023-08-01 DIAGNOSIS — E11.8 TYPE 2 DIABETES MELLITUS WITH COMPLICATION (MULTI): ICD-10-CM

## 2023-08-01 DIAGNOSIS — I10 HYPERTENSION, UNSPECIFIED TYPE: ICD-10-CM

## 2023-08-01 RX ORDER — DAPAGLIFLOZIN 10 MG/1
10 TABLET, FILM COATED ORAL DAILY
Qty: 30 TABLET | Refills: 3 | Status: SHIPPED | OUTPATIENT
Start: 2023-08-01 | End: 2023-11-27

## 2023-08-01 RX ORDER — FERROUS SULFATE 325(65) MG
1 TABLET, DELAYED RELEASE (ENTERIC COATED) ORAL DAILY
Qty: 30 TABLET | Refills: 3 | Status: SHIPPED | OUTPATIENT
Start: 2023-08-01 | End: 2023-12-04

## 2023-08-01 NOTE — TELEPHONE ENCOUNTER
Rx Refill Request Telephone Encounter    Name:  Neo Ordoñez  : 1960     Medication Name:  FERROUS SULFATE  Dose (Optional):    325 MG  Quantity (Optional):    30  Directions (Optional):   TAKE 1 DAILY    Medication Name:  FARXIGA  Dose (Optional):    10 MG  Quantity (Optional):    30   Directions (Optional):   TAKE 1 DAILY    ALLERGIES:   SEE LIST    Specific Pharmacy location:  [facility]    Date of last appointment:  2023  Date of next appointment:  2023    Best number to reach patient:  [phone]

## 2023-08-07 LAB
GRAM STAIN: ABNORMAL
TISSUE/WOUND CULTURE/SMEAR: ABNORMAL
TISSUE/WOUND CULTURE/SMEAR: ABNORMAL

## 2023-08-21 ENCOUNTER — OFFICE VISIT (OUTPATIENT)
Dept: PRIMARY CARE | Facility: CLINIC | Age: 63
End: 2023-08-21
Payer: COMMERCIAL

## 2023-08-21 ENCOUNTER — LAB (OUTPATIENT)
Dept: LAB | Facility: LAB | Age: 63
End: 2023-08-21
Payer: COMMERCIAL

## 2023-08-21 VITALS
SYSTOLIC BLOOD PRESSURE: 114 MMHG | WEIGHT: 213.4 LBS | HEART RATE: 69 BPM | OXYGEN SATURATION: 96 % | BODY MASS INDEX: 31.51 KG/M2 | RESPIRATION RATE: 18 BRPM | DIASTOLIC BLOOD PRESSURE: 60 MMHG | TEMPERATURE: 97.9 F

## 2023-08-21 DIAGNOSIS — R39.11 BENIGN PROSTATIC HYPERPLASIA WITH URINARY HESITANCY: ICD-10-CM

## 2023-08-21 DIAGNOSIS — R25.1 TREMORS OF NERVOUS SYSTEM: Primary | ICD-10-CM

## 2023-08-21 DIAGNOSIS — E11.8 TYPE 2 DIABETES MELLITUS WITH COMPLICATION (MULTI): ICD-10-CM

## 2023-08-21 DIAGNOSIS — N40.1 BENIGN PROSTATIC HYPERPLASIA WITH URINARY HESITANCY: ICD-10-CM

## 2023-08-21 DIAGNOSIS — E78.5 HYPERLIPIDEMIA, UNSPECIFIED HYPERLIPIDEMIA TYPE: ICD-10-CM

## 2023-08-21 LAB
CHOLESTEROL (MG/DL) IN SER/PLAS: 156 MG/DL (ref 0–199)
CHOLESTEROL IN HDL (MG/DL) IN SER/PLAS: 34.3 MG/DL
CHOLESTEROL/HDL RATIO: 4.5
ESTIMATED AVERAGE GLUCOSE FOR HBA1C: 100 MG/DL
HEMOGLOBIN A1C/HEMOGLOBIN TOTAL IN BLOOD: 5.1 %
LDL: 78 MG/DL (ref 0–99)
NON HDL CHOLESTEROL: 122 MG/DL
PROSTATE SPECIFIC ANTIGEN,SCREEN: 0.55 NG/ML (ref 0–4)
TRIGLYCERIDE (MG/DL) IN SER/PLAS: 221 MG/DL (ref 0–149)
VLDL: 44 MG/DL (ref 0–40)

## 2023-08-21 PROCEDURE — 3078F DIAST BP <80 MM HG: CPT | Performed by: FAMILY MEDICINE

## 2023-08-21 PROCEDURE — 3074F SYST BP LT 130 MM HG: CPT | Performed by: FAMILY MEDICINE

## 2023-08-21 PROCEDURE — 1036F TOBACCO NON-USER: CPT | Performed by: FAMILY MEDICINE

## 2023-08-21 PROCEDURE — 4010F ACE/ARB THERAPY RXD/TAKEN: CPT | Performed by: FAMILY MEDICINE

## 2023-08-21 PROCEDURE — 3008F BODY MASS INDEX DOCD: CPT | Performed by: FAMILY MEDICINE

## 2023-08-21 PROCEDURE — 36415 COLL VENOUS BLD VENIPUNCTURE: CPT

## 2023-08-21 PROCEDURE — 80061 LIPID PANEL: CPT

## 2023-08-21 PROCEDURE — 84153 ASSAY OF PSA TOTAL: CPT

## 2023-08-21 PROCEDURE — 83036 HEMOGLOBIN GLYCOSYLATED A1C: CPT

## 2023-08-21 PROCEDURE — 99213 OFFICE O/P EST LOW 20 MIN: CPT | Performed by: FAMILY MEDICINE

## 2023-08-21 RX ORDER — OXYCODONE HYDROCHLORIDE 5 MG/1
5 CAPSULE ORAL 3 TIMES DAILY
COMMUNITY
Start: 2023-07-18 | End: 2023-11-03 | Stop reason: ALTCHOICE

## 2023-08-21 RX ORDER — SEMAGLUTIDE 0.68 MG/ML
0.5 INJECTION, SOLUTION SUBCUTANEOUS
Qty: 3 ML | Refills: 2 | Status: CANCELLED | OUTPATIENT
Start: 2023-08-21 | End: 2023-11-13

## 2023-08-21 RX ORDER — GENTAMICIN SULFATE 1 MG/G
CREAM TOPICAL
COMMUNITY
Start: 2023-07-27

## 2023-08-21 NOTE — PROGRESS NOTES
Subjective   Patient ID: Neo Ordoñez is a 62 y.o. male who presents for Hypertension, Hyperlipidemia, and Diabetes.    HPI  Presents today for above reason    Pt tries to follow low fat/sugar/salt diet  Exercises: none  Denies SOB/chest pain/palpitations  Pt is not a smoker  No urinary or bowel issues per pt.  Sleeping well  Medications working well  Pt does check b/p at home sometimes. [facility] nurse come 2x weekly  Also going to wound clinic weekly  Pt does not check sugar levels    Would like to discuss Parkinson's  Pt has mentioned before to PCP and would like to re-visit    Yearly BW ordered.  Colonoscopy declined    Review of Systems  Constitutional:  no chills, no fever and no night sweats.  Eyes: no blurred vision and no eyesight problems.  ENT: no hearing loss, no nasal congestion, no hoarseness and no sore throat.  Neck: no mass (es) and no swelling.  Cardiovascular: no chest pain, no intermittent leg claudication, no lower extremity edema, no palpitation and no syncope.  Respiratory: no cough, no shortness of breath during exertion, no shortness of breath at rest and no wheezing.  Gastrointestinal: no abdominal pain, no blood in stools, no constipation, no diarrhea, no melena, no nausea, no rectal pain and no vomiting.  Genitourinary: no dysuria, no change in urinary frequency, no urinary hesitancy and no feelings of urinary urgency.  Musculoskeletal: no arthralgias, no back pain and no myalgias.  Integumentary: no new skin lesions and no rashes.  Neurological: no difficulty walking, no headache, no limb weakness, no numbness and no tingling.  Psychiatric/Behavioral: no anxiety, no depression, no anhedonia and no substance use disorders.  Endocrine: no recent weight gain and no recent weight loss.  Hematologic/Lymphatic: no tendency for easy bruising and no swollen glands    Objective   Physical Exam  Patient in for follow-up hypertension hyperlipidemia and diabetes right lower leg wound.  Overall no  severe complaints.  Tremoring is gotten worse he has not seen neurologist we will get him into see 1 and states this comes and goes tends to be worse on the left side.  No trauma.  He has the wound VAC in place in the right lower leg in the calf musculature still putting out a lot of drainage and follows up once a week with the surgeon  The wound care comes in to do dressing changes twice a week.  At some point we will need to see skin graft to close the wound.  Here with his wife states otherwise he is doing okay.  Physical exam today's office visit constitutional alert and oriented x3.    Head is atraumatic HEENT is within normal limits.    Neck supple no masses full range of motion.    Thyroid is normal in size no thyromegaly there is no carotid bruits.    Pulmonary exam shows clear to auscultation no respiratory distress.    Cardiovascular shows no murmur rub or gallop.  Regular rate and rhythm.    Abdominal exam soft nontender no hepatosplenomegaly or masses normal bowel sounds no rebound no guarding.    Musculoskeletal exam no joint pain no muscle pain full range of motion.    Psych exam normal mood and affect.    Dermatologic exam no skin lesions no rash no blemishes.    Neuro exam is no focal deficits.  Normal exam.    Extremities no edema normal pulses normal capillary refill.    /60   Pulse 69   Temp 36.6 °C (97.9 °F)   Resp 18   Wt 96.8 kg (213 lb 6.4 oz)   SpO2 96%   BMI 31.51 kg/m²     Lab Results   Component Value Date    WBC 6.3 07/18/2023    HGB 11.0 (L) 07/18/2023    HCT 34.3 (L) 07/18/2023    MCV 91 07/18/2023     07/18/2023       Assessment/Plan plan continue current medication get blood drawn currently on Ozempic 0.5 mg weekly may need to go up to 1 mg depends on his A1c level we will follow-up with him as soon as we get that report.  If doing well routine recheck 6 months if we make a change in his Ozempic we will need to check an A1c in 3 to 4 months  Problem List Items  Addressed This Visit       Benign prostatic hyperplasia with urinary hesitancy    Hyperlipidemia    Type 2 diabetes mellitus with complication (CMS/HCC)

## 2023-08-22 ENCOUNTER — TELEPHONE (OUTPATIENT)
Dept: PRIMARY CARE | Facility: CLINIC | Age: 63
End: 2023-08-22
Payer: COMMERCIAL

## 2023-08-22 NOTE — TELEPHONE ENCOUNTER
Pt is calling because he saw you yesterday and he had labs done. He's aware that the labs were normal. He states you had mentioned that you maybe wanted to change his Ozempic from .5 to 1? If so, please send to his pharmacy, [facility]    Thanks    Pt's # [phone]

## 2023-08-23 NOTE — TELEPHONE ENCOUNTER
Ernesto Romero MD  [id number] [facility] Clinical Support Staff17 hours ago (5:20 PM)       Is a hemoglobin A1c is 5.1.  There is no need to increase his Ozempic dose       Patient aware

## 2023-08-25 ENCOUNTER — LAB (OUTPATIENT)
Dept: LAB | Facility: LAB | Age: 63
End: 2023-08-25
Payer: COMMERCIAL

## 2023-08-25 LAB
ALANINE AMINOTRANSFERASE (SGPT) (U/L) IN SER/PLAS: 16 U/L (ref 10–52)
ALBUMIN (G/DL) IN SER/PLAS: 4.4 G/DL (ref 3.4–5)
ALKALINE PHOSPHATASE (U/L) IN SER/PLAS: 59 U/L (ref 33–136)
ASPARTATE AMINOTRANSFERASE (SGOT) (U/L) IN SER/PLAS: 13 U/L (ref 9–39)
BILIRUBIN DIRECT (MG/DL) IN SER/PLAS: 0.2 MG/DL (ref 0–0.3)
BILIRUBIN TOTAL (MG/DL) IN SER/PLAS: 1.1 MG/DL (ref 0–1.2)
PROTEIN TOTAL: 7.4 G/DL (ref 6.4–8.2)

## 2023-08-30 ENCOUNTER — PATIENT OUTREACH (OUTPATIENT)
Dept: PRIMARY CARE | Facility: CLINIC | Age: 63
End: 2023-08-30
Payer: COMMERCIAL

## 2023-08-30 NOTE — PROGRESS NOTES
Call placed regarding two month post discharge follow up call.  At time of outreach call the patient feels as if their condition has improved since initial visit with PCP or specialist. States he is doing well and the cellulitis is mostly cleared up at this time. States no more calls needed on this diagnosis.

## 2023-08-31 DIAGNOSIS — E11.9 TYPE 2 DIABETES MELLITUS WITHOUT COMPLICATION, WITHOUT LONG-TERM CURRENT USE OF INSULIN (MULTI): ICD-10-CM

## 2023-08-31 RX ORDER — SEMAGLUTIDE 0.68 MG/ML
INJECTION, SOLUTION SUBCUTANEOUS
Refills: 5 | OUTPATIENT
Start: 2023-08-31

## 2023-08-31 RX ORDER — PIOGLITAZONEHYDROCHLORIDE 30 MG/1
30 TABLET ORAL DAILY
Qty: 90 TABLET | Refills: 1 | Status: SHIPPED | OUTPATIENT
Start: 2023-08-31 | End: 2024-02-21 | Stop reason: SDUPTHER

## 2023-08-31 RX ORDER — SEMAGLUTIDE 0.68 MG/ML
0.5 INJECTION, SOLUTION SUBCUTANEOUS
Qty: 12 ML | Refills: 3 | Status: SHIPPED | OUTPATIENT
Start: 2023-08-31

## 2023-09-18 ENCOUNTER — OFFICE VISIT (OUTPATIENT)
Dept: PRIMARY CARE | Facility: CLINIC | Age: 63
End: 2023-09-18
Payer: COMMERCIAL

## 2023-09-18 VITALS
HEART RATE: 66 BPM | RESPIRATION RATE: 18 BRPM | TEMPERATURE: 97.8 F | SYSTOLIC BLOOD PRESSURE: 112 MMHG | BODY MASS INDEX: 31.72 KG/M2 | OXYGEN SATURATION: 96 % | WEIGHT: 214.8 LBS | DIASTOLIC BLOOD PRESSURE: 64 MMHG

## 2023-09-18 DIAGNOSIS — E11.8 TYPE 2 DIABETES MELLITUS WITH COMPLICATION (MULTI): ICD-10-CM

## 2023-09-18 DIAGNOSIS — Z12.11 SCREEN FOR COLON CANCER: ICD-10-CM

## 2023-09-18 PROCEDURE — 3008F BODY MASS INDEX DOCD: CPT | Performed by: FAMILY MEDICINE

## 2023-09-18 PROCEDURE — 1036F TOBACCO NON-USER: CPT | Performed by: FAMILY MEDICINE

## 2023-09-18 PROCEDURE — 3074F SYST BP LT 130 MM HG: CPT | Performed by: FAMILY MEDICINE

## 2023-09-18 PROCEDURE — 99213 OFFICE O/P EST LOW 20 MIN: CPT | Performed by: FAMILY MEDICINE

## 2023-09-18 PROCEDURE — 3044F HG A1C LEVEL LT 7.0%: CPT | Performed by: FAMILY MEDICINE

## 2023-09-18 PROCEDURE — 3078F DIAST BP <80 MM HG: CPT | Performed by: FAMILY MEDICINE

## 2023-09-18 PROCEDURE — 4010F ACE/ARB THERAPY RXD/TAKEN: CPT | Performed by: FAMILY MEDICINE

## 2023-09-18 RX ORDER — BLOOD SUGAR DIAGNOSTIC
1 STRIP MISCELLANEOUS DAILY
Qty: 200 STRIP | Refills: 1 | Status: SHIPPED | OUTPATIENT
Start: 2023-09-18

## 2023-09-18 RX ORDER — DEXTROSE 4 G
TABLET,CHEWABLE ORAL
Qty: 1 EACH | Refills: 0 | Status: SHIPPED | OUTPATIENT
Start: 2023-09-18

## 2023-09-18 RX ORDER — LANCETS
EACH MISCELLANEOUS
Qty: 200 EACH | Refills: 1 | Status: SHIPPED | OUTPATIENT
Start: 2023-09-18

## 2023-09-18 NOTE — PROGRESS NOTES
Subjective   Patient ID: Neo Ordoñez is a 63 y.o. male who presents for discuss medications.    HPI  Pt here to discuss DM  Pt states d/t medication pt is on Dr. Reyes is concerned about him going hypoglycemic and wants pt to start testing daily  Pt reports he is requesting a meter, strips and lancets    Still going to wound clinic    Colon screen ordered/ Pt needed repeat d/t polyps with last colon screen  Flu vaccine deferred    Review of Systems  Constitutional:  no chills, no fever and no night sweats.  Eyes: no blurred vision and no eyesight problems.  ENT: no hearing loss, no nasal congestion, no hoarseness and no sore throat.  Neck: no mass (es) and no swelling.  Cardiovascular: no chest pain, no intermittent leg claudication, no lower extremity edema, no palpitation and no syncope.  Respiratory: no cough, no shortness of breath during exertion, no shortness of breath at rest and no wheezing.  Gastrointestinal: no abdominal pain, no blood in stools, no constipation, no diarrhea, no melena, no nausea, no rectal pain and no vomiting.  Genitourinary: no dysuria, no change in urinary frequency, no urinary hesitancy and no feelings of urinary urgency.  Musculoskeletal: no arthralgias, no back pain and no myalgias.  Integumentary: no new skin lesions and no rashes.  Neurological: no difficulty walking, no headache, no limb weakness, no numbness and no tingling.  Psychiatric/Behavioral: no anxiety, no depression, no anhedonia and no substance use disorders.  Endocrine: no recent weight gain and no recent weight loss.  Hematologic/Lymphatic: no tendency for easy bruising and no swollen glands    Objective   Physical Exam  Patient in for follow-up chronic wound seeing plastic surgery his A1c was 5.1 but the surgeon is concerned he may become hypoglycemic has never had any hypoglycemic spells but request that he start checking at least daily sugars we will go ahead and send him in the meter and the strips.  No  other changes with medication at this point.  /64   Pulse 66   Temp 36.6 °C (97.8 °F)   Resp 18   Wt 97.4 kg (214 lb 12.8 oz)   SpO2 96%   BMI 31.72 kg/m²     Lab Results   Component Value Date    WBC 6.3 07/18/2023    HGB 11.0 (L) 07/18/2023    HCT 34.3 (L) 07/18/2023    MCV 91 07/18/2023     07/18/2023       Assessment/Plan   Problem List Items Addressed This Visit       Type 2 diabetes mellitus with complication (CMS/HCC)     Other Visit Diagnoses       Screen for colon cancer

## 2023-09-21 DIAGNOSIS — I10 HYPERTENSION, UNSPECIFIED TYPE: ICD-10-CM

## 2023-09-21 RX ORDER — AMLODIPINE BESYLATE 10 MG/1
10 TABLET ORAL DAILY
Qty: 90 TABLET | Refills: 1 | Status: SHIPPED | OUTPATIENT
Start: 2023-09-21 | End: 2024-03-01

## 2023-09-21 NOTE — TELEPHONE ENCOUNTER
Rx Refill Request Telephone Encounter    Name:  Neo Ordoñez  : 1960     Medication Name:  Amlodipine 10 mg  Quantity (Optional):    90 Refill: 5  Directions (Optional):   Take 1 tablet (10 mg) by mouth once daily.     Specific Pharmacy location:  [facility]    Date of last appointment:  23

## 2023-10-05 ENCOUNTER — OFFICE VISIT (OUTPATIENT)
Dept: WOUND CARE | Facility: CLINIC | Age: 63
End: 2023-10-05
Payer: COMMERCIAL

## 2023-10-05 PROCEDURE — 3044F HG A1C LEVEL LT 7.0%: CPT | Performed by: PLASTIC SURGERY

## 2023-10-05 PROCEDURE — 11046 DBRDMT MUSC&/FSCA EA ADDL: CPT

## 2023-10-05 PROCEDURE — 11046 DBRDMT MUSC&/FSCA EA ADDL: CPT | Performed by: PLASTIC SURGERY

## 2023-10-05 PROCEDURE — 1036F TOBACCO NON-USER: CPT | Performed by: PLASTIC SURGERY

## 2023-10-05 PROCEDURE — 3008F BODY MASS INDEX DOCD: CPT | Performed by: PLASTIC SURGERY

## 2023-10-05 PROCEDURE — 11043 DBRDMT MUSC&/FSCA 1ST 20/<: CPT | Performed by: PLASTIC SURGERY

## 2023-10-05 PROCEDURE — 11043 DBRDMT MUSC&/FSCA 1ST 20/<: CPT

## 2023-10-05 PROCEDURE — 4010F ACE/ARB THERAPY RXD/TAKEN: CPT | Performed by: PLASTIC SURGERY

## 2023-10-12 ENCOUNTER — LAB REQUISITION (OUTPATIENT)
Dept: LAB | Facility: HOSPITAL | Age: 63
End: 2023-10-12
Payer: COMMERCIAL

## 2023-10-12 ENCOUNTER — OFFICE VISIT (OUTPATIENT)
Dept: WOUND CARE | Facility: CLINIC | Age: 63
End: 2023-10-12
Payer: COMMERCIAL

## 2023-10-12 DIAGNOSIS — E11.622 TYPE 2 DIABETES MELLITUS WITH OTHER SKIN ULCER (CODE) (MULTI): ICD-10-CM

## 2023-10-12 DIAGNOSIS — T79.A21S TRAUMATIC COMPARTMENT SYNDROME OF RIGHT LOWER EXTREMITY, SEQUELA: ICD-10-CM

## 2023-10-12 DIAGNOSIS — L97.213: ICD-10-CM

## 2023-10-12 PROCEDURE — 4010F ACE/ARB THERAPY RXD/TAKEN: CPT | Performed by: PLASTIC SURGERY

## 2023-10-12 PROCEDURE — 3008F BODY MASS INDEX DOCD: CPT | Performed by: PLASTIC SURGERY

## 2023-10-12 PROCEDURE — 87070 CULTURE OTHR SPECIMN AEROBIC: CPT

## 2023-10-12 PROCEDURE — 11042 DBRDMT SUBQ TIS 1ST 20SQCM/<: CPT | Performed by: PLASTIC SURGERY

## 2023-10-12 PROCEDURE — 11042 DBRDMT SUBQ TIS 1ST 20SQCM/<: CPT

## 2023-10-12 PROCEDURE — 3044F HG A1C LEVEL LT 7.0%: CPT | Performed by: PLASTIC SURGERY

## 2023-10-12 PROCEDURE — 87075 CULTR BACTERIA EXCEPT BLOOD: CPT

## 2023-10-12 PROCEDURE — 1036F TOBACCO NON-USER: CPT | Performed by: PLASTIC SURGERY

## 2023-10-15 LAB
BACTERIA SPEC CULT: NORMAL
GRAM STN SPEC: NORMAL
GRAM STN SPEC: NORMAL

## 2023-10-19 ENCOUNTER — OFFICE VISIT (OUTPATIENT)
Dept: WOUND CARE | Facility: CLINIC | Age: 63
End: 2023-10-19
Payer: COMMERCIAL

## 2023-10-19 PROCEDURE — 1036F TOBACCO NON-USER: CPT | Performed by: PLASTIC SURGERY

## 2023-10-19 PROCEDURE — 17250 CHEM CAUT OF GRANLTJ TISSUE: CPT

## 2023-10-19 PROCEDURE — 3008F BODY MASS INDEX DOCD: CPT | Performed by: PLASTIC SURGERY

## 2023-10-19 PROCEDURE — 4010F ACE/ARB THERAPY RXD/TAKEN: CPT | Performed by: PLASTIC SURGERY

## 2023-10-19 PROCEDURE — 3044F HG A1C LEVEL LT 7.0%: CPT | Performed by: PLASTIC SURGERY

## 2023-10-19 PROCEDURE — 17250 CHEM CAUT OF GRANLTJ TISSUE: CPT | Performed by: PLASTIC SURGERY

## 2023-10-26 ENCOUNTER — APPOINTMENT (OUTPATIENT)
Dept: NEUROLOGY | Facility: HOSPITAL | Age: 63
End: 2023-10-26
Payer: COMMERCIAL

## 2023-10-26 ENCOUNTER — OFFICE VISIT (OUTPATIENT)
Dept: WOUND CARE | Facility: CLINIC | Age: 63
End: 2023-10-26
Payer: COMMERCIAL

## 2023-10-26 PROCEDURE — 4010F ACE/ARB THERAPY RXD/TAKEN: CPT | Performed by: PLASTIC SURGERY

## 2023-10-26 PROCEDURE — 29581 APPL MULTLAYER CMPRN SYS LEG: CPT

## 2023-10-26 PROCEDURE — 3008F BODY MASS INDEX DOCD: CPT | Performed by: PLASTIC SURGERY

## 2023-10-26 PROCEDURE — 29581 APPL MULTLAYER CMPRN SYS LEG: CPT | Performed by: PLASTIC SURGERY

## 2023-10-26 PROCEDURE — 17250 CHEM CAUT OF GRANLTJ TISSUE: CPT | Performed by: PLASTIC SURGERY

## 2023-10-26 PROCEDURE — 17250 CHEM CAUT OF GRANLTJ TISSUE: CPT

## 2023-10-26 PROCEDURE — 3044F HG A1C LEVEL LT 7.0%: CPT | Performed by: PLASTIC SURGERY

## 2023-10-26 PROCEDURE — 1036F TOBACCO NON-USER: CPT | Performed by: PLASTIC SURGERY

## 2023-10-31 DIAGNOSIS — M19.90 OSTEOARTHRITIS, UNSPECIFIED OSTEOARTHRITIS TYPE, UNSPECIFIED SITE: ICD-10-CM

## 2023-10-31 RX ORDER — MELOXICAM 15 MG/1
15 TABLET ORAL DAILY
Qty: 90 TABLET | Refills: 0 | Status: SHIPPED | OUTPATIENT
Start: 2023-10-31 | End: 2024-01-26

## 2023-11-02 ENCOUNTER — OFFICE VISIT (OUTPATIENT)
Dept: WOUND CARE | Facility: CLINIC | Age: 63
End: 2023-11-02
Payer: COMMERCIAL

## 2023-11-02 PROCEDURE — 3044F HG A1C LEVEL LT 7.0%: CPT | Performed by: PLASTIC SURGERY

## 2023-11-02 PROCEDURE — 29581 APPL MULTLAYER CMPRN SYS LEG: CPT

## 2023-11-02 PROCEDURE — 1036F TOBACCO NON-USER: CPT | Performed by: PLASTIC SURGERY

## 2023-11-02 PROCEDURE — 3008F BODY MASS INDEX DOCD: CPT | Performed by: PLASTIC SURGERY

## 2023-11-02 PROCEDURE — 99212 OFFICE O/P EST SF 10 MIN: CPT | Performed by: PLASTIC SURGERY

## 2023-11-02 PROCEDURE — 3075F SYST BP GE 130 - 139MM HG: CPT | Performed by: PLASTIC SURGERY

## 2023-11-02 PROCEDURE — 4010F ACE/ARB THERAPY RXD/TAKEN: CPT | Performed by: PLASTIC SURGERY

## 2023-11-02 PROCEDURE — 29581 APPL MULTLAYER CMPRN SYS LEG: CPT | Performed by: PLASTIC SURGERY

## 2023-11-02 PROCEDURE — 3079F DIAST BP 80-89 MM HG: CPT | Performed by: PLASTIC SURGERY

## 2023-11-03 ENCOUNTER — OFFICE VISIT (OUTPATIENT)
Dept: NEUROLOGY | Facility: HOSPITAL | Age: 63
End: 2023-11-03
Payer: COMMERCIAL

## 2023-11-03 VITALS
DIASTOLIC BLOOD PRESSURE: 82 MMHG | HEART RATE: 55 BPM | WEIGHT: 214 LBS | TEMPERATURE: 97.1 F | RESPIRATION RATE: 18 BRPM | HEIGHT: 69 IN | BODY MASS INDEX: 31.7 KG/M2 | SYSTOLIC BLOOD PRESSURE: 139 MMHG

## 2023-11-03 DIAGNOSIS — G25.0 ESSENTIAL TREMOR: Primary | ICD-10-CM

## 2023-11-03 DIAGNOSIS — R25.1 TREMOR: ICD-10-CM

## 2023-11-03 PROCEDURE — 3044F HG A1C LEVEL LT 7.0%: CPT | Performed by: PSYCHIATRY & NEUROLOGY

## 2023-11-03 PROCEDURE — 99214 OFFICE O/P EST MOD 30 MIN: CPT | Performed by: PSYCHIATRY & NEUROLOGY

## 2023-11-03 PROCEDURE — 4010F ACE/ARB THERAPY RXD/TAKEN: CPT | Performed by: PSYCHIATRY & NEUROLOGY

## 2023-11-03 PROCEDURE — 3075F SYST BP GE 130 - 139MM HG: CPT | Performed by: PSYCHIATRY & NEUROLOGY

## 2023-11-03 PROCEDURE — 1036F TOBACCO NON-USER: CPT | Performed by: PSYCHIATRY & NEUROLOGY

## 2023-11-03 PROCEDURE — 3008F BODY MASS INDEX DOCD: CPT | Performed by: PSYCHIATRY & NEUROLOGY

## 2023-11-03 PROCEDURE — 99214 OFFICE O/P EST MOD 30 MIN: CPT | Mod: GC | Performed by: PSYCHIATRY & NEUROLOGY

## 2023-11-03 PROCEDURE — 3079F DIAST BP 80-89 MM HG: CPT | Performed by: PSYCHIATRY & NEUROLOGY

## 2023-11-03 RX ORDER — PRIMIDONE 50 MG/1
TABLET ORAL
Qty: 90 TABLET | Refills: 11 | Status: SHIPPED | OUTPATIENT
Start: 2023-11-03 | End: 2024-01-08 | Stop reason: SDUPTHER

## 2023-11-03 ASSESSMENT — PAIN SCALES - GENERAL: PAINLEVEL: 0-NO PAIN

## 2023-11-03 NOTE — PROGRESS NOTES
Subjective     Neo Ordoñez is a right handed  63 y.o. year old male who presents for FUV of Tremor.  Visit type: follow up visit     HPI  He states that Primidone helped only 20 to 30 percent with the tremor when he increased the dose to 200 mg.Currently he is taking 250 mg at bed time.  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          Prior HX:Mr. Ordoñez is a 61 y/o RH man with 14 years of formal education past medical history significant for IDDM 2, HTN, HLD, and surgical history of a right calf fasciotomy on 7/4, cellulitis of right lower extremity with pain and edema. He is presenting today to clinic for initial evaluation of tremor. He is accompanied by his spouse.  He has tremor of both hands since he was teenager, currently tremor occurs at rest and during actions, worse on the left hand, it interferes with ADLs to some extent like cutting pills, preparing medications, he reports positive FH of tremor in father, no FH of PD.  No history of psychotropic medications or antiemetics. He doesn't drink alcohol, he doesn't drink coffee.     Prodromal (non-motor) features of PD:  Impaired olfaction: got worse after COVID-19 infection, he was infected three times  Constipation:no chronic constipation  Anxiety and depression: no  Susceptibility to fatigue: yes  REM sleep behavioral disorder: yes, for the last couple of years  Decreased contrast/color sensitivity: no  Cardiovascular dysfunction: hx of HTN     Meds: reviewed as listed     Allergies: reviewed as listed                           Review of Systems  All other system have been reviewed and are negative for complaint.  Objective   Neurological Exam    Physical Exam    Is the patient on medication for treating the symptoms of Parkinson's disease? No.   Is the patient on levodopa? No.   Stimulator State: N/A   Speech:  0  Facial Expression: 1  Rigidity: Neck - 0. RUE - 0. RLE - 0. LUE - 0. LLE - 0.   Finger Taps: Right - 1. Left - 2.   Hand Movements: Right - 1. Left - 2.   Pronation-Supination: Right - 1. Left - 2.   Toe Tapping: Right - 1. Left - 2.   Leg Agility: Right - 1. Left - 2.   Arising from chair: 0   Gait: 0   Freezing of Gait: 0   Postural Stability: 0   Posture: 0   Body Bradykinesia: 1   Postural Tremor: Right Hand - 1. Left Hand - 2.   Kinetic Tremor: Right Hand - 1. Left Hand - 2.   Rest Tremor: RUE - 0. RLE - 0. LUE - 0. LLE - 0.   Lip/Jaw: 0   Rogelio and Yahr Stage:     Hand writing is neat and legible.Spirals Archimedes is slightly tremulous in both hands but does not cross line.                                    Assessment/Plan Mr. Ordoñez is a 63 y/o RH man with 14 years of formal education past medical history significant for IDDM 2, HTN, HLD,He has tremor of both hands since he was teenager, currently tremor occurs at rest and during actions, worse on the left hand, it interferes with ADLs to some extent like cutting pills, preparing medications, he reports positive FH of tremor in father, no FH of PD.No history of psychotropic medications or antiemetics. He doesn't drink alcohol, he doesn't drink coffee.  He has hx of RBDs, impaired olfaction became noticeable after repeated COVID-19 infection.  Neurological examination is noticeable for mild rest, postural and action tremor of both hands, L>R, subtle bradykinesia with RRM L>R, normal muscle tone, gait is slow with reduced arm swing bilaterally l>RT The rest of neurological examination is largely non-focal.  He had 20-30 percent improvement with Primidone 250 mg daily so we plan to increase the dose to 250 mg BD with titration.       There are also subtle motor features of parkinsonism, but they don't meet the clinical threshold to establish a diagnosis of parkinsonism. We discussed Dong scan can DDx between ET and PD tremor and he is willing to do  that.  We recommend regular exercise.      Plan:  -Increase Primidone start with 25 mg daily in the morning/250 at night  and increase each week 25 mg until 250 mg BD, if not tolerated, please return to the previous tolerated dose and give us a call.  -DaTScan  -Follow-up in 3 months or earlier if needed

## 2023-11-03 NOTE — PATIENT INSTRUCTIONS
You were visited by Dr Dc and Dr De La Rosa today  You still have room to increase the medication Primidone  We will go titrate 25 mg increment every week  Week 1 Take Primidone 50 mg half tablet in the morning  Week 2 Take Primidone 50 mg one tablet in the morning  Week 3 Take Primidone 50 mg one and half  tablet in the morning  Week 4 Take Primidone 50 mg two  tablet in the morning  Week 5Take Primidone 50 mg two and half  tablet in the morning  Continue your Primidone 250 Mg at night  If you developed dizziness with increasing dose of medication each week return to previous dose of the last week  As you have have subtle  parkinsonism signs as well we can do Dong Scan  Dong Scan can differentiate between essential termor and Parkinson Disease  Continue Exercise  Lets see each other after 3-4 months

## 2023-11-09 ENCOUNTER — OFFICE VISIT (OUTPATIENT)
Dept: WOUND CARE | Facility: CLINIC | Age: 63
End: 2023-11-09
Payer: COMMERCIAL

## 2023-11-09 PROCEDURE — 3075F SYST BP GE 130 - 139MM HG: CPT | Performed by: PLASTIC SURGERY

## 2023-11-09 PROCEDURE — 99212 OFFICE O/P EST SF 10 MIN: CPT | Performed by: PLASTIC SURGERY

## 2023-11-09 PROCEDURE — 99212 OFFICE O/P EST SF 10 MIN: CPT

## 2023-11-09 PROCEDURE — 3044F HG A1C LEVEL LT 7.0%: CPT | Performed by: PLASTIC SURGERY

## 2023-11-09 PROCEDURE — 3079F DIAST BP 80-89 MM HG: CPT | Performed by: PLASTIC SURGERY

## 2023-11-09 PROCEDURE — 4010F ACE/ARB THERAPY RXD/TAKEN: CPT | Performed by: PLASTIC SURGERY

## 2023-11-09 PROCEDURE — 3008F BODY MASS INDEX DOCD: CPT | Performed by: PLASTIC SURGERY

## 2023-11-09 PROCEDURE — 1036F TOBACCO NON-USER: CPT | Performed by: PLASTIC SURGERY

## 2023-11-14 DIAGNOSIS — G25.0 ESSENTIAL TREMOR: Primary | ICD-10-CM

## 2023-11-20 ENCOUNTER — LAB (OUTPATIENT)
Dept: LAB | Facility: LAB | Age: 63
End: 2023-11-20
Payer: COMMERCIAL

## 2023-11-20 ENCOUNTER — OFFICE VISIT (OUTPATIENT)
Dept: PRIMARY CARE | Facility: CLINIC | Age: 63
End: 2023-11-20
Payer: COMMERCIAL

## 2023-11-20 VITALS
OXYGEN SATURATION: 99 % | TEMPERATURE: 96.7 F | WEIGHT: 212.4 LBS | DIASTOLIC BLOOD PRESSURE: 72 MMHG | BODY MASS INDEX: 31.46 KG/M2 | RESPIRATION RATE: 12 BRPM | SYSTOLIC BLOOD PRESSURE: 120 MMHG | HEIGHT: 69 IN | HEART RATE: 65 BPM

## 2023-11-20 DIAGNOSIS — E11.8 TYPE 2 DIABETES MELLITUS WITH COMPLICATION (MULTI): ICD-10-CM

## 2023-11-20 DIAGNOSIS — D64.9 ANEMIA, UNSPECIFIED TYPE: Primary | ICD-10-CM

## 2023-11-20 DIAGNOSIS — R53.83 OTHER FATIGUE: ICD-10-CM

## 2023-11-20 DIAGNOSIS — M25.562 CHRONIC PAIN OF BOTH KNEES: ICD-10-CM

## 2023-11-20 DIAGNOSIS — G89.29 CHRONIC PAIN OF BOTH KNEES: ICD-10-CM

## 2023-11-20 DIAGNOSIS — M25.561 CHRONIC PAIN OF BOTH KNEES: ICD-10-CM

## 2023-11-20 DIAGNOSIS — D64.9 ANEMIA, UNSPECIFIED TYPE: ICD-10-CM

## 2023-11-20 LAB
ANION GAP SERPL CALC-SCNC: 15 MMOL/L (ref 10–20)
BASOPHILS # BLD AUTO: 0.07 X10*3/UL (ref 0–0.1)
BASOPHILS NFR BLD AUTO: 0.9 %
BUN SERPL-MCNC: 20 MG/DL (ref 6–23)
CALCIUM SERPL-MCNC: 9.3 MG/DL (ref 8.6–10.3)
CHLORIDE SERPL-SCNC: 99 MMOL/L (ref 98–107)
CO2 SERPL-SCNC: 29 MMOL/L (ref 21–32)
CREAT SERPL-MCNC: 0.97 MG/DL (ref 0.5–1.3)
CREAT UR-MCNC: 85.4 MG/DL (ref 20–370)
EOSINOPHIL # BLD AUTO: 0.27 X10*3/UL (ref 0–0.7)
EOSINOPHIL NFR BLD AUTO: 3.3 %
ERYTHROCYTE [DISTWIDTH] IN BLOOD BY AUTOMATED COUNT: 14.5 % (ref 11.5–14.5)
EST. AVERAGE GLUCOSE BLD GHB EST-MCNC: 97 MG/DL
GFR SERPL CREATININE-BSD FRML MDRD: 88 ML/MIN/1.73M*2
GLUCOSE SERPL-MCNC: 91 MG/DL (ref 74–99)
HBA1C MFR BLD: 5 %
HCT VFR BLD AUTO: 43.6 % (ref 41–52)
HGB BLD-MCNC: 14.5 G/DL (ref 13.5–17.5)
IMM GRANULOCYTES # BLD AUTO: 0.02 X10*3/UL (ref 0–0.7)
IMM GRANULOCYTES NFR BLD AUTO: 0.2 % (ref 0–0.9)
IRON SATN MFR SERPL: 21 % (ref 25–45)
IRON SERPL-MCNC: 74 UG/DL (ref 35–150)
LYMPHOCYTES # BLD AUTO: 2.75 X10*3/UL (ref 1.2–4.8)
LYMPHOCYTES NFR BLD AUTO: 33.6 %
MCH RBC QN AUTO: 28.5 PG (ref 26–34)
MCHC RBC AUTO-ENTMCNC: 33.3 G/DL (ref 32–36)
MCV RBC AUTO: 86 FL (ref 80–100)
MICROALBUMIN UR-MCNC: 28.9 MG/L
MICROALBUMIN/CREAT UR: 33.8 UG/MG CREAT
MONOCYTES # BLD AUTO: 0.72 X10*3/UL (ref 0.1–1)
MONOCYTES NFR BLD AUTO: 8.8 %
NEUTROPHILS # BLD AUTO: 4.35 X10*3/UL (ref 1.2–7.7)
NEUTROPHILS NFR BLD AUTO: 53.2 %
NRBC BLD-RTO: 0 /100 WBCS (ref 0–0)
PLATELET # BLD AUTO: 164 X10*3/UL (ref 150–450)
POTASSIUM SERPL-SCNC: 3.4 MMOL/L (ref 3.5–5.3)
RBC # BLD AUTO: 5.09 X10*6/UL (ref 4.5–5.9)
SODIUM SERPL-SCNC: 140 MMOL/L (ref 136–145)
T4 FREE SERPL-MCNC: 0.85 NG/DL (ref 0.61–1.12)
TIBC SERPL-MCNC: 360 UG/DL (ref 240–445)
UIBC SERPL-MCNC: 286 UG/DL (ref 110–370)
VIT B12 SERPL-MCNC: 234 PG/ML (ref 211–911)
WBC # BLD AUTO: 8.2 X10*3/UL (ref 4.4–11.3)

## 2023-11-20 PROCEDURE — 3044F HG A1C LEVEL LT 7.0%: CPT | Performed by: FAMILY MEDICINE

## 2023-11-20 PROCEDURE — 85025 COMPLETE CBC W/AUTO DIFF WBC: CPT

## 2023-11-20 PROCEDURE — 82570 ASSAY OF URINE CREATININE: CPT

## 2023-11-20 PROCEDURE — 36415 COLL VENOUS BLD VENIPUNCTURE: CPT

## 2023-11-20 PROCEDURE — 3074F SYST BP LT 130 MM HG: CPT | Performed by: FAMILY MEDICINE

## 2023-11-20 PROCEDURE — 83550 IRON BINDING TEST: CPT

## 2023-11-20 PROCEDURE — 84439 ASSAY OF FREE THYROXINE: CPT

## 2023-11-20 PROCEDURE — 82043 UR ALBUMIN QUANTITATIVE: CPT

## 2023-11-20 PROCEDURE — 80048 BASIC METABOLIC PNL TOTAL CA: CPT

## 2023-11-20 PROCEDURE — 3008F BODY MASS INDEX DOCD: CPT | Performed by: FAMILY MEDICINE

## 2023-11-20 PROCEDURE — 82607 VITAMIN B-12: CPT

## 2023-11-20 PROCEDURE — 83540 ASSAY OF IRON: CPT

## 2023-11-20 PROCEDURE — 83036 HEMOGLOBIN GLYCOSYLATED A1C: CPT

## 2023-11-20 PROCEDURE — 99213 OFFICE O/P EST LOW 20 MIN: CPT | Performed by: FAMILY MEDICINE

## 2023-11-20 PROCEDURE — 3078F DIAST BP <80 MM HG: CPT | Performed by: FAMILY MEDICINE

## 2023-11-20 PROCEDURE — 1036F TOBACCO NON-USER: CPT | Performed by: FAMILY MEDICINE

## 2023-11-20 ASSESSMENT — PATIENT HEALTH QUESTIONNAIRE - PHQ9
1. LITTLE INTEREST OR PLEASURE IN DOING THINGS: NOT AT ALL
SUM OF ALL RESPONSES TO PHQ9 QUESTIONS 1 AND 2: 0
2. FEELING DOWN, DEPRESSED OR HOPELESS: NOT AT ALL

## 2023-11-20 NOTE — PROGRESS NOTES
"Subjective   Patient ID: Neo Ordoñez is a 63 y.o. male who presents for Fatigue and Knee Pain.    HPI  Patient presents today complaining of fatigue x 4 months. States he wants to nap after doing any activity. He works nights, so he has a very scattered sleep schedule. States it hasn't bothered him before.     Complaining of bilateral knee pain x 3 months. This is waxing, and waning.  No injury, or swelling. Has tried his Meloxicam. Has been taking 2 instead of 1. States it helps \"sometimes\".      Has no other new problem /question.      Colon screen ordered. Reminded to do    Review of Systems  Constitutional:  no chills, no fever and no night sweats.  Eyes: no blurred vision and no eyesight problems.  ENT: no hearing loss, no nasal congestion, no hoarseness and no sore throat.  Neck: no mass (es) and no swelling.  Cardiovascular: no chest pain, no intermittent leg claudication, no lower extremity edema, no palpitation and no syncope.  Respiratory: no cough, no shortness of breath during exertion, no shortness of breath at rest and no wheezing.  Gastrointestinal: no abdominal pain, no blood in stools, no constipation, no diarrhea, no melena, no nausea, no rectal pain and no vomiting.  Genitourinary: no dysuria, no change in urinary frequency, no urinary hesitancy and no feelings of urinary urgency.  Musculoskeletal: no arthralgias, no back pain and no myalgias.  Integumentary: no new skin lesions and no rashes.  Neurological: no difficulty walking, no headache, no limb weakness, no numbness and no tingling.  Psychiatric/Behavioral: no anxiety, no depression, no anhedonia and no substance use disorders.  Endocrine: no recent weight gain and no recent weight loss.  Hematologic/Lymphatic: no tendency for easy bruising and no swollen glands    Objective   Physical Exam  Patient continues with resting tremors has a scan tomorrow through neurology to try to differentiate essential tremor versus Parkinson's tremor.  " "Complains of fatigue since his surgery he had some decrease in hemoglobin we will need to check that along with thyroid.  He states he still had pain in the right leg status post fasciotomy for the cellulitis complains of bilateral knee pain.  He has crepitus and clunking in the left knee and crepitus in the right knee.  Lungs clear cardiac and abdominal exams are benign.  Tremor is better on primidone was increased 2 weeks ago by his neurologist to 500 mg a day.  Pulse 65   Temp 35.9 °C (96.7 °F)   Resp 12   Ht 1.753 m (5' 9\")   Wt 96.3 kg (212 lb 6.4 oz)   SpO2 99%   BMI 31.37 kg/m²     Lab Results   Component Value Date    WBC 6.3 07/18/2023    HGB 11.0 (L) 07/18/2023    HCT 34.3 (L) 07/18/2023    MCV 91 07/18/2023     07/18/2023       Assessment/Plan plan is to await findings of his scan from neurology we will get x-rays of his knees get blood drawn follow-up when we have the results  Problem List Items Addressed This Visit       Type 2 diabetes mellitus with complication (CMS/HCC)     "

## 2023-11-21 ENCOUNTER — APPOINTMENT (OUTPATIENT)
Dept: RADIOLOGY | Facility: HOSPITAL | Age: 63
End: 2023-11-21
Payer: COMMERCIAL

## 2023-11-22 ENCOUNTER — ANCILLARY PROCEDURE (OUTPATIENT)
Dept: RADIOLOGY | Facility: CLINIC | Age: 63
End: 2023-11-22
Payer: COMMERCIAL

## 2023-11-22 DIAGNOSIS — M25.561 CHRONIC PAIN OF BOTH KNEES: ICD-10-CM

## 2023-11-22 DIAGNOSIS — G89.29 CHRONIC PAIN OF BOTH KNEES: ICD-10-CM

## 2023-11-22 DIAGNOSIS — M25.562 CHRONIC PAIN OF BOTH KNEES: ICD-10-CM

## 2023-11-22 PROCEDURE — 73562 X-RAY EXAM OF KNEE 3: CPT | Mod: 50

## 2023-11-22 PROCEDURE — 73562 X-RAY EXAM OF KNEE 3: CPT | Mod: BILATERAL PROCEDURE | Performed by: RADIOLOGY

## 2023-11-27 DIAGNOSIS — E11.8 TYPE 2 DIABETES MELLITUS WITH COMPLICATION (MULTI): ICD-10-CM

## 2023-11-27 RX ORDER — DAPAGLIFLOZIN 10 MG/1
10 TABLET, FILM COATED ORAL DAILY
Qty: 30 TABLET | Refills: 5 | Status: SHIPPED | OUTPATIENT
Start: 2023-11-27

## 2023-11-28 ENCOUNTER — APPOINTMENT (OUTPATIENT)
Dept: RADIOLOGY | Facility: HOSPITAL | Age: 63
End: 2023-11-28
Payer: COMMERCIAL

## 2023-11-28 ENCOUNTER — TELEPHONE (OUTPATIENT)
Dept: PRIMARY CARE | Facility: CLINIC | Age: 63
End: 2023-11-28
Payer: COMMERCIAL

## 2023-11-28 DIAGNOSIS — M17.10 ARTHRITIS OF KNEE: ICD-10-CM

## 2023-11-28 NOTE — TELEPHONE ENCOUNTER
----- Message from Ernesto Romero MD sent at 11/27/2023  6:12 PM EST -----  Severe arthritis in both knees.  If he continues to have pain refer to orthopedics for evaluation

## 2023-11-29 ENCOUNTER — TELEPHONE (OUTPATIENT)
Dept: PRIMARY CARE | Facility: CLINIC | Age: 63
End: 2023-11-29
Payer: COMMERCIAL

## 2023-11-29 NOTE — TELEPHONE ENCOUNTER
"Pt reports that he is sleeping 16-17 hours a day. He wakes up only to eat dinner, wash dishes and walk his dog for 2-3 miles and then he goes right back to sleep. He stated despite sleeping that long he wakes up exhausted. When PT referral was mentioned pt did not seem agreeable as he said he has tried \"everything fitness wise\". Does PCP recommend anything else? Please advise.   "

## 2023-11-29 NOTE — TELEPHONE ENCOUNTER
Pt aware. At the time of my call he was not agreeable to PT referral but stated he will give us a call back if he changes his mind. Nothing further needed, encounter closed.

## 2023-11-29 NOTE — TELEPHONE ENCOUNTER
Patient is aware his blood work was normal. He is wondering why he is still experiencing extreme fatigue, and if anything can be done for it? Please advise.

## 2023-12-04 DIAGNOSIS — I10 HYPERTENSION, UNSPECIFIED TYPE: ICD-10-CM

## 2023-12-04 RX ORDER — FERROUS SULFATE 325(65) MG
1 TABLET, DELAYED RELEASE (ENTERIC COATED) ORAL DAILY
Qty: 90 TABLET | Refills: 1 | Status: SHIPPED | OUTPATIENT
Start: 2023-12-04

## 2023-12-06 ENCOUNTER — OFFICE VISIT (OUTPATIENT)
Dept: ORTHOPEDIC SURGERY | Facility: CLINIC | Age: 63
End: 2023-12-06
Payer: COMMERCIAL

## 2023-12-06 DIAGNOSIS — M17.10 ARTHRITIS OF KNEE: ICD-10-CM

## 2023-12-06 PROCEDURE — 99214 OFFICE O/P EST MOD 30 MIN: CPT | Performed by: FAMILY MEDICINE

## 2023-12-06 PROCEDURE — 3061F NEG MICROALBUMINURIA REV: CPT | Performed by: FAMILY MEDICINE

## 2023-12-06 PROCEDURE — 1036F TOBACCO NON-USER: CPT | Performed by: FAMILY MEDICINE

## 2023-12-06 PROCEDURE — 3044F HG A1C LEVEL LT 7.0%: CPT | Performed by: FAMILY MEDICINE

## 2023-12-06 PROCEDURE — 3008F BODY MASS INDEX DOCD: CPT | Performed by: FAMILY MEDICINE

## 2023-12-06 ASSESSMENT — PAIN DESCRIPTION - DESCRIPTORS: DESCRIPTORS: ACHING;DISCOMFORT

## 2023-12-06 ASSESSMENT — PAIN - FUNCTIONAL ASSESSMENT: PAIN_FUNCTIONAL_ASSESSMENT: 0-10

## 2023-12-06 ASSESSMENT — PAIN SCALES - GENERAL: PAINLEVEL_OUTOF10: 6

## 2023-12-06 NOTE — PROGRESS NOTES
Acute Injury New Patient Visit    CC:   Chief Complaint   Patient presents with    Left Knee - Referral     Ref by Dr. Romero  Dx with compartment syndrome sx on July 4th 2023    Right Knee - Pain       HPI: Neo is a 63 y.o.male who presents today with new complaints of bilateral knee pain status post left ACL reconstruction by Dr. Moi ambriz in 1993.  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  Of note this past July he had right lower extremity spontaneous compartment syndrome and is status post 4 compartment right lower extremity fasciotomy.  He is doing very well he states that there was no obvious known cause for the injury.  He presents here today for further evaluation.        Review of Systems   GENERAL: Negative for malaise, significant weight loss, fever  MUSCULOSKELETAL: See HPI  NEURO: Negative for numbness / tingling     Past Medical History  Past Medical History:   Diagnosis Date    Allergy status to unspecified drugs, medicaments and biological substances 03/16/2020    History of allergy    Body mass index (BMI) 34.0-34.9, adult 08/02/2021    BMI 34.0-34.9,adult    Body mass index (BMI) 35.0-35.9, adult     BMI 35.0-35.9,adult    Compartment syndrome (CMS/[facility]) 07/04/2023    Sx    Encounter for general adult medical examination without abnormal findings 01/27/2020    Routine general medical examination at a health care facility    Encounter for screening for malignant neoplasm of prostate 01/27/2020    Special screening for malignant neoplasm of prostate    Hyperlipidemia, unspecified 09/17/2019    Dyslipidemia with elevated low density lipoprotein (LDL) cholesterol and abnormally low high density lipoprotein cholesterol    Morbid (severe) obesity due to excess calories (CMS/HCC) 08/02/2021    Class 2 severe obesity due to excess calories with serious comorbidity and body mass index (BMI) of  35.0 to 35.9 in adult    Other conditions influencing health status 03/04/2020    History of burning on urination    Other microscopic hematuria 03/04/2020    Other microscopic hematuria    Personal history of other diseases of the respiratory system 06/22/2019    History of acute sinusitis    Personal history of other diseases of the respiratory system 09/17/2019    History of acute sinusitis    Personal history of other endocrine, nutritional and metabolic disease 09/17/2019    History of type 2 diabetes mellitus    Personal history of other endocrine, nutritional and metabolic disease 05/07/2021    History of type 2 diabetes mellitus    Personal history of other specified conditions 03/04/2020    History of right flank pain    Personal history of other specified conditions 03/16/2020    History of right flank pain    Personal history of other specified conditions 03/16/2020    History of nasal congestion    Personal history of urinary calculi 03/16/2020    History of kidney stones    Tinnitus, right ear 04/09/2021    Tinnitus of right ear    Unspecified abdominal pain 03/04/2020    Flank pain, acute       Medication review  Medication Documentation Review Audit       Reviewed by Cole C Budinsky, MD (Physician) on 12/06/23 at 1045      Medication Order Taking? Sig Documenting Provider Last Dose Status   amLODIPine (Norvasc) 10 mg tablet [id number] No Take 1 tablet (10 mg) by mouth once daily. Ernesto Romero MD Taking Active   blood sugar diagnostic (OneTouch Ultra Test) strip [id number] No 1 strip once daily. Ernesto Romero MD Taking Active   blood-glucose meter (OneTouch Ultra2 Meter) misc [id number] No Test daily Ernesto Romero MD Taking Active   docusate sodium (Colace) 100 mg capsule [id number] No Take by mouth twice a day. Historical Provider, MD Taking Active   Farxiga 10 mg [id number]  TAKE 1 TABLET BY MOUTH EVERY DAY Ernesto Romero MD  Active   Discontinued 12/04/23 1615   ferrous sulfate 325  (65 Fe) MG EC tablet [id number]  TAKE 1 TABLET (65 MG) BY MOUTH ONCE DAILY. Ernesto Romero MD  Active   gentamicin (Garamycin) 0.1 % cream [id number] No APPLY TO AFFECTED AREA RIGHT LOWER LEG THREE TIMES A WEEK Historical Provider, MD Taking Active   lancets misc [id number] No Test daily Ernesto Romero MD Taking Active   meloxicam (Mobic) 15 mg tablet [id number] No TAKE 1 TABLET BY MOUTH EVERY DAY Ernesto Romero MD Taking Active   metoprolol tartrate (Lopressor) 50 mg tablet [id number] No Take 1 tablet (50 mg) by mouth in the morning and 1 tablet (50 mg) before bedtime. Ernesto Romero MD Taking Active   olmesartan-hydrochlorothiazide (BENIcar HCT) 40-12.5 mg tablet [id number] No Take 1 tablet by mouth once daily. Ernesto Romero MD Taking Active   pioglitazone (Actos) 30 mg tablet [id number] No TAKE 1 TABLET BY MOUTH ONCE DAILY. Ernesto Romero MD Taking Active   polyethylene glycol-electrolytes (polyethylene glycol) 420 gram solution [id number] No Take 17 g by mouth twice a day. Historical Provider, MD Taking Active   primidone (Mysoline) 50 mg tablet [id number] No Take half tablet first week in the morning,take one tablet in the morning in week 2,take one and half in week three, Take two tablet in the morning week 4,take 2.5 tablet in week 5 Myrna Crenshaw MD Taking Active   semaglutide (Ozempic) 0.25 mg or 0.5 mg (2 mg/3 mL) pen injector [id number] No Inject 0.5 mg under the skin every 7 days. Ernesto Romero MD Taking Active                    Allergies  Allergies   Allergen Reactions    Sulfamethoxazole Anaphylaxis    Sulfasalazine Anaphylaxis       Social History  Social History     Socioeconomic History    Marital status:      Spouse name: Not on file    Number of children: Not on file    Years of education: Not on file    Highest education level: Not on file   Occupational History    Not on file   Tobacco Use    Smoking status: Never    Smokeless tobacco: Never   Substance and  Sexual Activity    Alcohol use: Not Currently    Drug use: Never    Sexual activity: Not on file   Other Topics Concern    Not on file   Social History Narrative    Not on file     Social Determinants of Health     Financial Resource Strain: Not on file   Food Insecurity: Not on file   Transportation Needs: Not on file   Physical Activity: Not on file   Stress: Not on file   Social Connections: Not on file   Intimate Partner Violence: Not on file   Housing Stability: Not on file       Surgical History  Past Surgical History:   Procedure Laterality Date    OTHER SURGICAL HISTORY  09/17/2019    Cholecystectomy    OTHER SURGICAL HISTORY  09/17/2019    Anterior cruciate ligament repair       Physical Exam:  GENERAL:  Patient is awake, alert, and oriented to person place and time.  Patient appears well nourished and well kept.  Affect Calm, Not Acutely Distressed.  HEENT:  Normocephalic, Atraumatic, EOMI  CARDIOVASCULAR:  Hemodynamically stable.  RESPIRATORY:  Normal respirations with unlabored breathing.  NEURO: Gross sensation intact to the lower extremities bilaterally.  Extremity: Right knee exam: The affected knee was examined and inspected and was tender to the touch along the medial and lateral aspect with catching, locking or mechanical symptoms. The skin was intact without breakdown or open wound. Old incisions if present were healed. There was a mild Federica exam seen with some evidence of instability & weakness in the collateral ligaments with varus/valgus stress & laxity in the anterior or posterior planes. There was a negative Lachman´s test, pivot shift test and posterior drawer sign with no foot drop, numbness or tingling. Sensation, reflexes and pulses in the foot and ankle are preserved. There was an effusion. Range of motion showed good straight leg raise with flexion to 125 degrees and extension to 0 degrees. The patient had the ability to bear weight, but with discomfort. The patient´s gait was  antalgic secondary to the discomfort.  Left knee exam: The affected knee was examined and inspected and was tender to the touch along the medial and lateral aspect with catching, locking or mechanical symptoms. The skin was intact without breakdown or open wound. Old incisions if present were healed. There was a mild Federica exam seen with some evidence of instability & weakness in the collateral ligaments with varus/valgus stress & laxity in the anterior or posterior planes. There was a negative Lachman´s test, pivot shift test and posterior drawer sign with no foot drop, numbness or tingling. Sensation, reflexes and pulses in the foot and ankle are preserved. There was an effusion. Range of motion showed good straight leg raise with flexion to 125 degrees and extension to 5 degrees. The patient had the ability to bear weight, but with discomfort. The patient´s gait was antalgic secondary to the discomfort.      Diagnostics: Previous images reviewed consistent with moderate bilateral medial tricompartmental osteoarthritic changes status post ACL reconstruction on the left knee        Procedure: None  Procedures    Assessment:   Problem List Items Addressed This Visit    None  Visit Diagnoses       Arthritis of knee                 Plan: Had a lengthy discussion with the patient here today, we will consider bilateral gel injections going forward.  Does have well-controlled type 2 diabetes with A1c at 5.1, at this point he would be agreeable to holding off on steroid injection here today.  We will provide him with home exercises he does utilize ibuprofen 800 mg twice a day he is also interested in continuing with Voltaren.  In the past meloxicam he was taking up to 4 tablets a day which did not provide relief and his primary care had strongly encouraged him to discontinue doing that.  He can try intermittent icing until seen in follow-up as well.  We will submit for the bilateral gel injections.  We will also  discuss at further length the ability to provide him with medial offloading bracing, given his complex right lower extremity history with the spontaneous compartment syndrome we may want to hold off on bracing for him.  At this time he is certainly not interested in any type of surgical procedure or partial or full knee replacement.  In conclusion, this patient has osteoarthritis of the knee which is symptomatic.  This is causing significant morning stiffness lasting over an hour.  The patient has popping clicking and grinding in the knee with range of motion that is decreased and gets worse with prolonged standing or going up and down stairs.  This affects functional activities and activities of daily living.  There is radiographic evidence of osteoarthritis with joint space narrowing and marginal osteophyte formation.  This patient has also failed nonpharmacologic treatment for osteoarthritis including attempts at weight loss, and a home exercise program with or without physical therapy.  The patient has also failed pharmacologic treatments for osteoarthritis including over-the-counter analgesics, anti-inflammatory medication as well as injectable treatments.  For these reasons I feel the patient is a candidate for Visco supplementation injections of the knee.  No orders of the defined types were placed in this encounter.     At the conclusion of the visit there were no further questions by the patient/family regarding their plan of care.  Patient was instructed to call or return with any issues, questions, or concerns regarding their injury and/or treatment plan described above.     12/06/23 at 10:45 AM - Cole C Budinsky, MD    Office: [phone]    This note was prepared using voice recognition software.  The details of this note are correct and have been reviewed, and corrected to the best of my ability.  Some grammatical errors may persist related to the Dragon software.

## 2023-12-18 ENCOUNTER — APPOINTMENT (OUTPATIENT)
Dept: RADIOLOGY | Facility: HOSPITAL | Age: 63
End: 2023-12-18
Payer: COMMERCIAL

## 2023-12-18 ENCOUNTER — OFFICE VISIT (OUTPATIENT)
Dept: ORTHOPEDIC SURGERY | Facility: CLINIC | Age: 63
End: 2023-12-18
Payer: COMMERCIAL

## 2023-12-18 DIAGNOSIS — M25.562 ACUTE PAIN OF BOTH KNEES: Primary | ICD-10-CM

## 2023-12-18 DIAGNOSIS — M25.561 ACUTE PAIN OF BOTH KNEES: Primary | ICD-10-CM

## 2023-12-18 DIAGNOSIS — M17.10 ARTHRITIS OF KNEE: ICD-10-CM

## 2023-12-18 PROCEDURE — 1036F TOBACCO NON-USER: CPT | Performed by: FAMILY MEDICINE

## 2023-12-18 PROCEDURE — 99213 OFFICE O/P EST LOW 20 MIN: CPT | Performed by: FAMILY MEDICINE

## 2023-12-18 PROCEDURE — 3008F BODY MASS INDEX DOCD: CPT | Performed by: FAMILY MEDICINE

## 2023-12-18 PROCEDURE — 3044F HG A1C LEVEL LT 7.0%: CPT | Performed by: FAMILY MEDICINE

## 2023-12-18 PROCEDURE — 3061F NEG MICROALBUMINURIA REV: CPT | Performed by: FAMILY MEDICINE

## 2023-12-18 ASSESSMENT — PAIN SCALES - GENERAL: PAINLEVEL_OUTOF10: 6

## 2023-12-18 ASSESSMENT — PAIN - FUNCTIONAL ASSESSMENT: PAIN_FUNCTIONAL_ASSESSMENT: 0-10

## 2023-12-18 NOTE — PROGRESS NOTES
Patient ID: Neo Ordoñez is a 63 y.o. male.    L Inj/Asp: bilateral knee on 12/19/2023 9:43 AM  Indications: pain and joint swelling  Details: 20 G needle, ultrasound-guided superolateral approach  Medications (Right): 60 mg sodium hyaluronate 60 mg/3 mL  Medications (Left): 60 mg sodium hyaluronate 60 mg/3 mL  Outcome: tolerated well, no immediate complications  Procedure, treatment alternatives, risks and benefits explained, specific risks discussed. Consent was given by the patient. Immediately prior to procedure a time out was called to verify the correct patient, procedure, equipment, support staff and site/side marked as required. Patient was prepped and draped in the usual sterile fashion.           Patient presents here today for bilateral knee Durolane injections.  We also discussed with holding off on his off  bracing for now.  He will continue with compression stockings.  We will tentatively have him follow-up in 3 months for repeat evaluation.  Should he have persistent worsening pain to the knees we will offer him follow-up evaluation with Dr. Erick Mckenna III, his surgeon who completed the compartment syndrome fasciotomies.  He will continue with his Voltaren gel intermittent icing and his home exercises.  At follow-up we can revisit the potential need for off  bracing.  For now we will maintain no bracing and just compression socks due to the prior spontaneous compartment syndrome without obvious etiology to his lower extremity.    At the conclusion of the visit there were no further questions by the patient/family regarding their plan of care.  Patient was instructed to call or return with any issues, questions, or concerns regarding their injury and/or treatment plan described above.    This note was prepared using voice recognition software.  The details of this note are correct and have been reviewed, and corrected to the best of my ability.  Some grammatical errors may persist  related to the Dragon software     Cole C. Budinsky, MD  Office: [phone]

## 2023-12-19 PROCEDURE — 20611 DRAIN/INJ JOINT/BURSA W/US: CPT | Performed by: FAMILY MEDICINE

## 2023-12-21 ENCOUNTER — HOSPITAL ENCOUNTER (OUTPATIENT)
Dept: RADIOLOGY | Facility: HOSPITAL | Age: 63
Discharge: HOME | End: 2023-12-21
Payer: COMMERCIAL

## 2023-12-21 DIAGNOSIS — G25.0 ESSENTIAL TREMOR: ICD-10-CM

## 2023-12-21 PROCEDURE — 78803 RP LOCLZJ TUM SPECT 1 AREA: CPT | Performed by: STUDENT IN AN ORGANIZED HEALTH CARE EDUCATION/TRAINING PROGRAM

## 2023-12-21 PROCEDURE — 3430000001 HC RX 343 DIAGNOSTIC RADIOPHARMACEUTICALS: Performed by: PSYCHIATRY & NEUROLOGY

## 2023-12-21 PROCEDURE — A9584 IODINE I-123 IOFLUPANE: HCPCS | Performed by: PSYCHIATRY & NEUROLOGY

## 2023-12-21 PROCEDURE — 78803 RP LOCLZJ TUM SPECT 1 AREA: CPT

## 2023-12-21 RX ORDER — IOFLUPANE I-123 2 MCI/ML
4.9 INJECTION, SOLUTION INTRAVENOUS
Status: COMPLETED | OUTPATIENT
Start: 2023-12-21 | End: 2023-12-21

## 2023-12-21 RX ADMIN — IOFLUPANE I-123 4.9 MILLICURIE: 2 INJECTION, SOLUTION INTRAVENOUS at 08:15

## 2023-12-27 ENCOUNTER — TELEPHONE (OUTPATIENT)
Dept: NEUROLOGY | Facility: HOSPITAL | Age: 63
End: 2023-12-27
Payer: COMMERCIAL

## 2023-12-27 NOTE — TELEPHONE ENCOUNTER
I called the patient to return results of DaTscan, he didn't answer my call, I left a message and a call back number in case of questions.

## 2024-01-04 ENCOUNTER — OFFICE VISIT (OUTPATIENT)
Dept: WOUND CARE | Facility: CLINIC | Age: 64
End: 2024-01-04
Payer: COMMERCIAL

## 2024-01-04 PROCEDURE — 11042 DBRDMT SUBQ TIS 1ST 20SQCM/<: CPT

## 2024-01-04 PROCEDURE — 29581 APPL MULTLAYER CMPRN SYS LEG: CPT | Mod: 59

## 2024-01-04 PROCEDURE — 11042 DBRDMT SUBQ TIS 1ST 20SQCM/<: CPT | Performed by: PLASTIC SURGERY

## 2024-01-04 PROCEDURE — 99213 OFFICE O/P EST LOW 20 MIN: CPT | Mod: 25

## 2024-01-04 PROCEDURE — 99213 OFFICE O/P EST LOW 20 MIN: CPT | Performed by: PLASTIC SURGERY

## 2024-01-08 ENCOUNTER — TELEMEDICINE (OUTPATIENT)
Dept: NEUROLOGY | Facility: HOSPITAL | Age: 64
End: 2024-01-08
Payer: COMMERCIAL

## 2024-01-08 ENCOUNTER — TELEPHONE (OUTPATIENT)
Dept: ORTHOPEDIC SURGERY | Facility: CLINIC | Age: 64
End: 2024-01-08
Payer: COMMERCIAL

## 2024-01-08 DIAGNOSIS — G25.0 ESSENTIAL TREMOR: Primary | ICD-10-CM

## 2024-01-08 PROCEDURE — 99213 OFFICE O/P EST LOW 20 MIN: CPT | Mod: 95 | Performed by: PSYCHIATRY & NEUROLOGY

## 2024-01-08 PROCEDURE — 99213 OFFICE O/P EST LOW 20 MIN: CPT | Performed by: PSYCHIATRY & NEUROLOGY

## 2024-01-08 RX ORDER — PRIMIDONE 250 MG/1
250 TABLET ORAL 2 TIMES DAILY
Qty: 180 TABLET | Refills: 3 | Status: SHIPPED | OUTPATIENT
Start: 2024-01-08 | End: 2025-01-07

## 2024-01-08 NOTE — TELEPHONE ENCOUNTER
Had Gel injections 12/18/23, still in a lot of pain and has been using Voltaren Gel and Ibuprofen  Wants to know if there is something else he can try        [phone]

## 2024-01-10 DIAGNOSIS — M17.10 ARTHRITIS OF KNEE: Primary | ICD-10-CM

## 2024-01-10 RX ORDER — METHYLPREDNISOLONE 4 MG/1
TABLET ORAL
Qty: 1 EACH | Refills: 0 | Status: SHIPPED | OUTPATIENT
Start: 2024-01-10

## 2024-01-10 NOTE — PROGRESS NOTES
Called and discussed the patient's increasing worsening pain to the right knee.  At this time we will offer him an MRI of the right knee will also send over a steroid pack.  He does state that he has well-controlled diabetes.  We will also provide him with initiation of 6 weeks off work note he states he does have short-term disability and at this point is not able to tolerate the full 12-hour shifts required of him.  He has asked work for for 8-hour shifts however those are not able to be provided for him either.  He also stated that his wound has opened up and he is back into wound care, this will preclude us from offering him an off  brace.  We did discuss the potential for an off  brace in the past and he seems reasonable to proceed going forward with that however the wound will take precedent.    We will order the steroid pack for him tonight, we will date an off work note 6 weeks to begin today 1/10/2024.  We will submit for the MRI internal derangement of the right knee, chronic osteoarthritis, medial meniscus tear.  He will follow-up with Dr. Erick Mckenna III to review the results of the MRI and to discuss the plan going forward.    At the conclusion of the visit there were no further questions by the patient/family regarding their plan of care.  Patient was instructed to call or return with any issues, questions, or concerns regarding their injury and/or treatment plan described above.    This note was prepared using voice recognition software.  The details of this note are correct and have been reviewed, and corrected to the best of my ability.  Some grammatical errors may persist related to the Dragon software     Cole C. Budinsky, MD  Office: [phone]

## 2024-01-11 ENCOUNTER — OFFICE VISIT (OUTPATIENT)
Dept: WOUND CARE | Facility: CLINIC | Age: 64
End: 2024-01-11
Payer: COMMERCIAL

## 2024-01-11 DIAGNOSIS — S83.249A ACUTE RADIAL TEAR OF MEDIAL MENISCUS: ICD-10-CM

## 2024-01-11 DIAGNOSIS — M23.91 INTERNAL DERANGEMENT OF RIGHT KNEE: ICD-10-CM

## 2024-01-11 DIAGNOSIS — M17.10 ARTHRITIS OF KNEE: ICD-10-CM

## 2024-01-11 PROCEDURE — 99212 OFFICE O/P EST SF 10 MIN: CPT | Mod: 25

## 2024-01-11 PROCEDURE — 99212 OFFICE O/P EST SF 10 MIN: CPT | Performed by: PLASTIC SURGERY

## 2024-01-13 NOTE — PROGRESS NOTES
Neo Ordoñez is a right handed  63 y.o. year old male who presents for FUV of Tremor.  Visit type: follow up visit (Virtual visit/Audio only)      HPI  He states that Primidone helped 50 percent with the tremor when he increased the dose to 250 mg BID.  He states that he has stiffness in his shoulders bcz of frozen shoulder,maybe some slowness but does not report any balance problem. DaTscan was performed on 12/21/2023 and was normal. 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 He has returned to work end of October.        Prior HX:Mr. Ordoñez is a 64 y/o RH man with 14 years of formal education past medical history significant for IDDM 2, HTN, HLD, and surgical history of a right calf fasciotomy on 7/4, cellulitis of right lower extremity with pain and edema. He is presenting today to clinic for initial evaluation of tremor. He is accompanied by his spouse.  He has tremor of both hands since he was teenager, currently tremor occurs at rest and during actions, worse on the left hand, it interferes with ADLs to some extent like cutting pills, preparing medications, he reports positive FH of tremor in father, no FH of PD.  No history of psychotropic medications or antiemetics. He doesn't drink alcohol, he doesn't drink coffee.     Prodromal (non-motor) features of PD:  Impaired olfaction: got worse after COVID-19 infection, he was infected three times  Constipation:no chronic constipation  Anxiety and depression: no  Susceptibility to fatigue: yes  REM sleep behavioral disorder: yes, for the last couple of years  Decreased contrast/color sensitivity: no  Cardiovascular dysfunction: hx of HTN     Meds: reviewed as listed     Allergies: reviewed as listed     Review of Systems  All other system have been reviewed and are negative for complaint.    Objective   Neurological Exam     Exam limited due to phone call, he was unable to do a video visit.     Assessment/Plan   Mr. Ordoñez is a 63 y/o RH man with 14  years of formal education past medical history significant for IDDM 2, HTN, HLD,He has tremor of both hands since he was teenager, currently tremor occurs at rest and during actions, worse on the left hand, it interferes with ADLs to some extent like cutting pills, preparing medications, he reports positive FH of tremor in father, no FH of PD.No history of psychotropic medications or antiemetics. He doesn't drink alcohol, he doesn't drink coffee.  He reports improvement of tremor with primidone 250 mg BID, lately ran out of medication and he was given directions to up-titrate the dose to the previous tolerated dose of 250 mg BID, medication refilled. DaTscan was performed on 12/21/2023 and was normal.  I offered PT but he prefers to wait for now. Follow-up in 6 months or earlier if needed.         Plan:  -Restart primidone, directions were given with gradual up-titration to 250 mg BID.  -Follow-up in 6 months or earlier if needed

## 2024-01-26 DIAGNOSIS — M19.90 OSTEOARTHRITIS, UNSPECIFIED OSTEOARTHRITIS TYPE, UNSPECIFIED SITE: ICD-10-CM

## 2024-01-26 RX ORDER — MELOXICAM 15 MG/1
15 TABLET ORAL DAILY
Qty: 90 TABLET | Refills: 0 | Status: SHIPPED | OUTPATIENT
Start: 2024-01-26 | End: 2024-05-07

## 2024-01-29 ENCOUNTER — HOSPITAL ENCOUNTER (OUTPATIENT)
Dept: RADIOLOGY | Facility: CLINIC | Age: 64
Discharge: HOME | End: 2024-01-29
Payer: COMMERCIAL

## 2024-01-29 DIAGNOSIS — M17.10 ARTHRITIS OF KNEE: ICD-10-CM

## 2024-01-29 DIAGNOSIS — M23.91 INTERNAL DERANGEMENT OF RIGHT KNEE: ICD-10-CM

## 2024-01-29 DIAGNOSIS — S83.249A ACUTE RADIAL TEAR OF MEDIAL MENISCUS: ICD-10-CM

## 2024-01-29 PROCEDURE — 73721 MRI JNT OF LWR EXTRE W/O DYE: CPT | Mod: RT

## 2024-01-29 PROCEDURE — 73721 MRI JNT OF LWR EXTRE W/O DYE: CPT | Mod: RIGHT SIDE | Performed by: STUDENT IN AN ORGANIZED HEALTH CARE EDUCATION/TRAINING PROGRAM

## 2024-02-05 ENCOUNTER — OFFICE VISIT (OUTPATIENT)
Dept: ORTHOPEDIC SURGERY | Facility: CLINIC | Age: 64
End: 2024-02-05
Payer: COMMERCIAL

## 2024-02-05 ENCOUNTER — TELEPHONE (OUTPATIENT)
Dept: GASTROENTEROLOGY | Facility: CLINIC | Age: 64
End: 2024-02-05

## 2024-02-05 DIAGNOSIS — M17.10 ARTHRITIS OF KNEE: Primary | ICD-10-CM

## 2024-02-05 PROCEDURE — 99213 OFFICE O/P EST LOW 20 MIN: CPT | Performed by: STUDENT IN AN ORGANIZED HEALTH CARE EDUCATION/TRAINING PROGRAM

## 2024-02-05 PROCEDURE — 3008F BODY MASS INDEX DOCD: CPT | Performed by: STUDENT IN AN ORGANIZED HEALTH CARE EDUCATION/TRAINING PROGRAM

## 2024-02-05 PROCEDURE — 1036F TOBACCO NON-USER: CPT | Performed by: STUDENT IN AN ORGANIZED HEALTH CARE EDUCATION/TRAINING PROGRAM

## 2024-02-05 NOTE — PROGRESS NOTES
Chief Complaint   Patient presents with    Right Knee - Establish Care     Pt. Known to Dr. Mckenna for a Rt four compartment fasciotomy  Having Rt knee pain now, sent by Dr. Budinsky  X-ray and MRI done at [facility]  Patient presents today for follow up of right knee.  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  States that he has pain to bilateral knees particular at the medial joint line.  Pain particular with standing longer periods of time.  Endorses episodes of instability as well.  Has attempted multiple forms of conservative treatment to include cortisone injections, activity modifications, bracing, and gel injections with little to no relief.  Presents today for further evaluation and treatment      Physical exam  General: Alert and oriented to place, person, and time.  No acute distress and breathing comfortably; pleasant and cooperative with the examination.  Extremity:  Bilateral knee:  Skin healthy and intact.  Prior 4 compartment fasciotomy incisions well-healed  No gross swelling or ecchymosis  Alignment: Varus  Effusion: Mild  ROM: 10-1 10  Crepitance with range of motion  No pain with internal rotation of the hip  Tenderness to palpation: Medial joint line     No laxity to valgus stress  No laxity to varus stress  Negative Lachman´s test  Negative posterior drawer test  Mild pain with Federica´s test     Neurovascular exam normal distally  2+ DP pulse and good cap refill    Diagnostics:  MR knee right wo IV contrast    Result Date: 1/29/2024  Interpreted By:  Eliu Mobley and Baker Zachary STUDY: MRI of the  right knee without IV contrast;  1/29/2024 2:30 pm   INDICATION: Signs/Symptoms:pain.   Patient is a 63-year-old male with chronic bilateral knee pain, worse in the medial side.   COMPARISON: Bilateral knee radiographs on 11/22/2023.   ACCESSION NUMBER(S): [id number]   ORDERING  CLINICIAN: COLE BUDINSKY   TECHNIQUE: MR imaging of the  right knee was obtained  without IV contrast.   FINDINGS: LIGAMENTS AND TENDONS: The anterior cruciate ligament and the posterior cruciate ligaments are intact. The medial collateral ligament is intact. The lateral collateral ligament, the biceps femoris tendon, the popliteus tendon and the iliotibial band are intact. The quadriceps tendon and the patellar tendon are intact.   MENISCI: The lateral meniscus is intact and without evidence of tear. Complex, radial tear of the body/posterior horn junction of the medial meniscus, with approximately 2 mm of extrusion.   JOINTS: Moderate diffuse cartilage loss of the medial tibiofemoral compartment, with a full-thickness defect along the posterior weight-bearing aspect of the medial femoral condyle (series 4, image 22). Subchondral marrow edema and cyst-like changes along the medial femoral condyle and medial tibial plateau. Additionally, there is mild marginal osteophytosis. Mild diffuse cartilage loss of the lateral tibiofemoral compartment without full-thickness defect. Full-thickness cartilage defect within the lateral femoral trochlea (series 3, image 15) with extensive marrow edema throughout the lateral femoral condyle and lateral patellar facet. There is mild marginal osteophytosis. Small knee joint effusion. Small Baker's cyst.   OSSEOUS STRUCTURES: Marrow edema within the anterior aspect of the lateral femoral condyle, as well as lateral patellar facet.   SOFT TISSUES: There is mild edema in the superolateral aspect of Hoffa's fat pad. There is no muscle atrophy or tear. The common peroneal nerve is intact.       1. Complex, predominantly radial tear of the body/posterior horn junction of the medial meniscus. 2. Tricompartmental degenerative changes of the right knee, severe in the medial tibiofemoral and lateral patellofemoral compartments. There are additional findings which may reflect patellar  maltracking in the appropriate clinical scenario. 3. Small knee joint effusion.   I personally reviewed the images/study and I agree with the findings as stated. This study was interpreted at [facility], [place], [place].   MACRO: None   Signed by: Eliu Mobley 1/29/2024 3:21 PM Dictation workstation:   [id number]       Procedures  Procedures     Assessment:  63-year-old male with bilateral severe knee arthritis    Treatment plan:  X-ray findings discussed with patient in detail.  He does have severe knee arthritis.  He has attempted multiple forms of conservative treatment.  States that he feels like he cannot safely complete his full day of work due to pain and discomfort about his knee.  Not interested in a total knee replacement at this point in time.  Wants to discuss disability paperwork for disability regarding his severe knee arthritis.  Will discuss with Dr. Romero as to nature of this  In the meantime range of motion as tolerated activities as tolerated using pain as a guide  Discussed importance of low impact exercises  Did discuss potentially using a medial  brace, however given prior 4 compartment fasciotomy and prior incisions may lead to breakdown which may preclude use of brace wear  All of the patient's questions concerns answered

## 2024-02-14 ENCOUNTER — TELEPHONE (OUTPATIENT)
Dept: ORTHOPEDIC SURGERY | Facility: CLINIC | Age: 64
End: 2024-02-14
Payer: COMMERCIAL

## 2024-02-14 NOTE — PROGRESS NOTES
"Subjective   Patient ID: Neo Ordoñez \"MARCOS\" is a 63 y.o. male who presents for Diabetes and Hypertension.  HPI    HTN follow up  Denies chest pain,SOB, swelling, headaches, lightheadedness or dizziness.   Eats a generally healthy diet, Exercises.   Does not check BP at home.   Currently taking amlodipine and metoprolol    DM  Does check glucose at home. Eats a generally healthy diet, Exercises. Currently taking farxiga, pioglitazone and Ozempic. Last A1c on 11/20/23 @ 5.0 %. Last eye exam 1 year ago. Does not see a Podiatrist.      Taking current medications which were reviewed.  Problem list discussed.    Overall doing well.  Eating okay.  Staying active.    Has no other new problem /question.     ROS  Constitutional- No activity change. No appetite change.  Eyes- Denies vision changes.  Respiratory- No shortness of breath.  Cardiovascular- No palpitations. No chest pain.  GI- No nausea or vomiting. No diarrhea or constipation. Denies abdominal pain.  Musculoskeletal- Denies joint swelling.  Extremities- No edema.  Neurological- Denies headaches. Denies dizziness.  Skin- No rashes.  Psychiatric/Behavioral- Denies significant anxiety, or depressed mood.     Objective     /78   Pulse 58   Resp 18   Ht 1.753 m (5' 9\")   Wt 96.4 kg (212 lb 9.6 oz)   SpO2 98%   BMI 31.40 kg/m²     Allergies   Allergen Reactions    Sulfamethoxazole Anaphylaxis    Sulfasalazine Anaphylaxis       Constitutional-- Well-nourished.  No distress  Head- unremarkable.  Ears- TMs clear.  Eyes- PERRL.  Conjunctiva normal.  Nose- Normal.  No rhinorrhea noted.  Throat- Oropharynx is clear and moist.  Neck- Supple with no thyromegaly.  No significant cervical adenopathy noted.  Pulmonary/Chest- Breath sounds normal with normal effort.  No wheezing.  Heart- Regular rate and rhythm.  No murmur.  Abdomen- Soft and non-tender.  No masses noted.  Musculoskeletal- Normal ROM.  No significant joint swelling  Extremities- No edema. "   Neurological- Alert.  No noted deficits.  Skin- Warm.  No rashes.  Psychiatric/Behavioral- Mood and affect normal.  Behavior normal.   She is in for follow-up anxiety diabetes hypertension stable at present chronic knee pain has seen orthopedics recommended knee replacement so far he does not want to do that.  A1c last checked in November was perfect at 5.0 blood pressure is controlled needs some refills on medication.  Well-developed well-nourished male in no acute distress chronic problem stable.  Physical exam today's office visit constitutional alert and oriented x3.    Head is atraumatic HEENT is within normal limits.    Neck supple no masses full range of motion.    Thyroid is normal in size no thyromegaly there is no carotid bruits.    Pulmonary exam shows clear to auscultation no respiratory distress.    Cardiovascular shows no murmur rub or gallop.  Regular rate and rhythm.    Abdominal exam soft nontender no hepatosplenomegaly or masses normal bowel sounds no rebound no guarding.    Musculoskeletal exam no joint pain no muscle pain full range of motion.    Psych exam normal mood and affect.    Dermatologic exam no skin lesions no rash no blemishes.    Neuro exam is no focal deficits.  Normal exam.    Extremities no edema normal pulses normal capillary refill.    Assessment/Plan plan is to continue current treatment course routine recheck 6 months repeat labs at that visit  1. Type 2 diabetes mellitus with complication (CMS/HCC)        2. Hyperlipidemia, unspecified hyperlipidemia type        3. Hypertension, unspecified type  metoprolol tartrate (Lopressor) 50 mg tablet      4. Type 2 diabetes mellitus without complication, without long-term current use of insulin (CMS/HCC)  pioglitazone (Actos) 30 mg tablet             Long talk. Treatment options reviewed.    Continue and take your medications as prescribed.    Health Maintenance issues discussed.    Importance of healthy diet and regular exercise  regimen discussed.    We will contact you with any test results ordered. If you do not hear from us, please contact.    Follow-up as instructed or sooner if any problems or symptoms do not resolve as expected.

## 2024-02-14 NOTE — TELEPHONE ENCOUNTER
Called patient back, he said he is still having trouble with his knees, he had made an apt with Dr. Budinsky, but it was cancelled because the patient has been see YENNY for his knees.  I told the patient that if he is still having trouble and isn't ready to do a knee replacement, then he should make a follow up apt with YENNY to discuss other options.  Told him to call [phone] and hit option 1 to be connected with someone who can schedule him.

## 2024-02-21 ENCOUNTER — OFFICE VISIT (OUTPATIENT)
Dept: PRIMARY CARE | Facility: CLINIC | Age: 64
End: 2024-02-21
Payer: COMMERCIAL

## 2024-02-21 ENCOUNTER — TELEPHONE (OUTPATIENT)
Dept: PRIMARY CARE | Facility: CLINIC | Age: 64
End: 2024-02-21

## 2024-02-21 ENCOUNTER — OFFICE VISIT (OUTPATIENT)
Dept: ORTHOPEDIC SURGERY | Facility: CLINIC | Age: 64
End: 2024-02-21
Payer: COMMERCIAL

## 2024-02-21 VITALS
BODY MASS INDEX: 31.49 KG/M2 | RESPIRATION RATE: 18 BRPM | HEART RATE: 58 BPM | OXYGEN SATURATION: 98 % | SYSTOLIC BLOOD PRESSURE: 120 MMHG | DIASTOLIC BLOOD PRESSURE: 78 MMHG | WEIGHT: 212.6 LBS | HEIGHT: 69 IN

## 2024-02-21 DIAGNOSIS — E78.5 HYPERLIPIDEMIA, UNSPECIFIED HYPERLIPIDEMIA TYPE: ICD-10-CM

## 2024-02-21 DIAGNOSIS — E11.8 TYPE 2 DIABETES MELLITUS WITH COMPLICATION (MULTI): ICD-10-CM

## 2024-02-21 DIAGNOSIS — I10 HYPERTENSION, UNSPECIFIED TYPE: ICD-10-CM

## 2024-02-21 DIAGNOSIS — E11.9 TYPE 2 DIABETES MELLITUS WITHOUT COMPLICATION, WITHOUT LONG-TERM CURRENT USE OF INSULIN (MULTI): ICD-10-CM

## 2024-02-21 DIAGNOSIS — M17.4 OTHER SECONDARY OSTEOARTHRITIS OF BOTH KNEES: Primary | ICD-10-CM

## 2024-02-21 PROCEDURE — 3078F DIAST BP <80 MM HG: CPT | Performed by: FAMILY MEDICINE

## 2024-02-21 PROCEDURE — 3008F BODY MASS INDEX DOCD: CPT | Performed by: FAMILY MEDICINE

## 2024-02-21 PROCEDURE — 1036F TOBACCO NON-USER: CPT | Performed by: STUDENT IN AN ORGANIZED HEALTH CARE EDUCATION/TRAINING PROGRAM

## 2024-02-21 PROCEDURE — 20610 DRAIN/INJ JOINT/BURSA W/O US: CPT | Mod: 50 | Performed by: STUDENT IN AN ORGANIZED HEALTH CARE EDUCATION/TRAINING PROGRAM

## 2024-02-21 PROCEDURE — 2500000005 HC RX 250 GENERAL PHARMACY W/O HCPCS: Performed by: STUDENT IN AN ORGANIZED HEALTH CARE EDUCATION/TRAINING PROGRAM

## 2024-02-21 PROCEDURE — 2500000004 HC RX 250 GENERAL PHARMACY W/ HCPCS (ALT 636 FOR OP/ED): Performed by: STUDENT IN AN ORGANIZED HEALTH CARE EDUCATION/TRAINING PROGRAM

## 2024-02-21 PROCEDURE — 1036F TOBACCO NON-USER: CPT | Performed by: FAMILY MEDICINE

## 2024-02-21 PROCEDURE — 99214 OFFICE O/P EST MOD 30 MIN: CPT | Performed by: STUDENT IN AN ORGANIZED HEALTH CARE EDUCATION/TRAINING PROGRAM

## 2024-02-21 PROCEDURE — 99213 OFFICE O/P EST LOW 20 MIN: CPT | Performed by: FAMILY MEDICINE

## 2024-02-21 PROCEDURE — 3008F BODY MASS INDEX DOCD: CPT | Performed by: STUDENT IN AN ORGANIZED HEALTH CARE EDUCATION/TRAINING PROGRAM

## 2024-02-21 PROCEDURE — 3074F SYST BP LT 130 MM HG: CPT | Performed by: FAMILY MEDICINE

## 2024-02-21 RX ORDER — PIOGLITAZONEHYDROCHLORIDE 30 MG/1
30 TABLET ORAL DAILY
Qty: 30 TABLET | Refills: 5 | Status: SHIPPED | OUTPATIENT
Start: 2024-02-21

## 2024-02-21 RX ORDER — TRIAMCINOLONE ACETONIDE 40 MG/ML
40 INJECTION, SUSPENSION INTRA-ARTICULAR; INTRAMUSCULAR
Status: COMPLETED | OUTPATIENT
Start: 2024-02-21 | End: 2024-02-21

## 2024-02-21 RX ORDER — LIDOCAINE HYDROCHLORIDE 10 MG/ML
4 INJECTION INFILTRATION; PERINEURAL
Status: COMPLETED | OUTPATIENT
Start: 2024-02-21 | End: 2024-02-21

## 2024-02-21 RX ORDER — METOPROLOL TARTRATE 50 MG/1
50 TABLET ORAL 2 TIMES DAILY
Qty: 60 TABLET | Refills: 5 | Status: SHIPPED | OUTPATIENT
Start: 2024-02-21

## 2024-02-21 RX ADMIN — TRIAMCINOLONE ACETONIDE 40 MG: 40 INJECTION, SUSPENSION INTRA-ARTICULAR; INTRAMUSCULAR at 11:21

## 2024-02-21 RX ADMIN — LIDOCAINE HYDROCHLORIDE 4 ML: 10 INJECTION, SOLUTION INFILTRATION; PERINEURAL at 11:21

## 2024-02-21 NOTE — TELEPHONE ENCOUNTER
LMOM TO RETURN CALL - NEED MORE SPECIFIC INFORMATION - DOES IT NEED TO SAY SOMETHING SPECIFIC OR FOR ANY CERTAIN DATES, ETC?

## 2024-02-21 NOTE — PROGRESS NOTES
Chief Complaint   Patient presents with    Left Knee - Post-op     osteoarthritis    Right Knee - Post-op     osteoarthritis       HPI  Patient presents today for follow up of bilateral knees.  Patient with known history of severe bilateral knee arthritis.  Open proceed with cortisone injections today.  States that prior Durolane injections provided little to no relief.  Not interested in any type of surgical intervention at this point in time continues to endorse severe pain about bilateral knees.  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       Physical exam  General: Alert and oriented to place, person, and time.  No acute distress and breathing comfortably; pleasant and cooperative with the examination.  Extremity:  Bilateral knee:  Skin healthy and intact  No gross swelling or ecchymosis  Alignment: Varus  Effusion: Mild  ROM: 10-1 10  Crepitance with range of motion  No pain with internal rotation of the hip  Tenderness to palpation: Medial and lateral joint line     No laxity to valgus stress  No laxity to varus stress  Negative Lachman´s test  Negative posterior drawer test  Mild pain with Federica´s test     Neurovascular exam normal distally  2+ DP pulse and good cap refill    Diagnostics:  MR knee right wo IV contrast    Result Date: 1/29/2024  Interpreted By:  Eliu Mobley and Baker Zachary STUDY: MRI of the  right knee without IV contrast;  1/29/2024 2:30 pm   INDICATION: Signs/Symptoms:pain.   Patient is a 63-year-old male with chronic bilateral knee pain, worse in the medial side.   COMPARISON: Bilateral knee radiographs on 11/22/2023.   ACCESSION NUMBER(S): [id number]   ORDERING CLINICIAN: COLE BUDINSKY   TECHNIQUE: MR imaging of the  right knee was obtained  without IV contrast.   FINDINGS: LIGAMENTS AND TENDONS: The anterior cruciate ligament and the posterior cruciate ligaments are intact. The medial collateral ligament is  intact. The lateral collateral ligament, the biceps femoris tendon, the popliteus tendon and the iliotibial band are intact. The quadriceps tendon and the patellar tendon are intact.   MENISCI: The lateral meniscus is intact and without evidence of tear. Complex, radial tear of the body/posterior horn junction of the medial meniscus, with approximately 2 mm of extrusion.   JOINTS: Moderate diffuse cartilage loss of the medial tibiofemoral compartment, with a full-thickness defect along the posterior weight-bearing aspect of the medial femoral condyle (series 4, image 22). Subchondral marrow edema and cyst-like changes along the medial femoral condyle and medial tibial plateau. Additionally, there is mild marginal osteophytosis. Mild diffuse cartilage loss of the lateral tibiofemoral compartment without full-thickness defect. Full-thickness cartilage defect within the lateral femoral trochlea (series 3, image 15) with extensive marrow edema throughout the lateral femoral condyle and lateral patellar facet. There is mild marginal osteophytosis. Small knee joint effusion. Small Baker's cyst.   OSSEOUS STRUCTURES: Marrow edema within the anterior aspect of the lateral femoral condyle, as well as lateral patellar facet.   SOFT TISSUES: There is mild edema in the superolateral aspect of Hoffa's fat pad. There is no muscle atrophy or tear. The common peroneal nerve is intact.       1. Complex, predominantly radial tear of the body/posterior horn junction of the medial meniscus. 2. Tricompartmental degenerative changes of the right knee, severe in the medial tibiofemoral and lateral patellofemoral compartments. There are additional findings which may reflect patellar maltracking in the appropriate clinical scenario. 3. Small knee joint effusion.   I personally reviewed the images/study and I agree with the findings as stated. This study was interpreted at [facility], [place], [place].    MACRO: None   Signed by: Eliu Mobley 1/29/2024 3:21 PM Dictation workstation:   [id number]       Procedures  L Inj/Asp: bilateral knee on 2/21/2024 11:21 AM  Indications: pain  Details: 22 G needle, anterolateral approach  Medications (Right): 40 mg triamcinolone acetonide 40 mg/mL; 4 mL lidocaine 10 mg/mL (1 %)  Medications (Left): 40 mg triamcinolone acetonide 40 mg/mL; 4 mL lidocaine 10 mg/mL (1 %)  Consent was given by the patient. Immediately prior to procedure a time out was called to verify the correct patient, procedure, equipment, support staff and site/side marked as required. Patient was prepped and draped in the usual sterile fashion.            Assessment:  63-year-old male with bilateral severe knee arthritis, history of prior 4 compartment fasciotomy    Treatment plan:  Will proceed with bilateral cortisone injections today  I discussed risks and benefits of corticosteroid injection and the patient would like to proceed.  Discussed risks of skin depigmentation and the rare but possible intravascular injection of local anesthetic which can cause palpitations or arrhythmias. I discussed the possibility of a transient increase in blood sugar. I explained that the local anesthetic tends to work immediately, it may take 2-3 days for the full effect of the corticosteroid compound. Consent was obtained and side confirmed by the patient.  Patient not interested in any type of total knee replacement at this point in time  We will continue with injection therapy as long as feasible.  All of the patient's questions concerns answered

## 2024-02-21 NOTE — TELEPHONE ENCOUNTER
PATIENT SAW YOU TODAY AND FORGOT TO ASK IF OK FOR A NOTE FOR HIS WORK TO CONFIRM THAT HE IS GOING TO BE ON DISABILITY INDEFINITELY.    PLEASE ADVISE IF OK FOR NOTE

## 2024-02-22 NOTE — TELEPHONE ENCOUNTER
LM TRC. He would need an appointment for this. The form he dropped off is for short term disability.

## 2024-02-22 NOTE — TELEPHONE ENCOUNTER
Patient called back and is aware. Made appointment for Friday 2/23/24  States he thinks it's going to be long term disability, they gave him the wrong forms. He will bring in the correct forms

## 2024-02-23 ENCOUNTER — TELEPHONE (OUTPATIENT)
Dept: PRIMARY CARE | Facility: CLINIC | Age: 64
End: 2024-02-23

## 2024-02-23 ENCOUNTER — OFFICE VISIT (OUTPATIENT)
Dept: PRIMARY CARE | Facility: CLINIC | Age: 64
End: 2024-02-23
Payer: COMMERCIAL

## 2024-02-23 VITALS
OXYGEN SATURATION: 96 % | WEIGHT: 212.9 LBS | DIASTOLIC BLOOD PRESSURE: 60 MMHG | HEIGHT: 69 IN | SYSTOLIC BLOOD PRESSURE: 130 MMHG | RESPIRATION RATE: 18 BRPM | HEART RATE: 69 BPM | BODY MASS INDEX: 31.53 KG/M2

## 2024-02-23 DIAGNOSIS — M79.A21 NON-TRAUMATIC COMPARTMENT SYNDROME OF RIGHT LOWER EXTREMITY: ICD-10-CM

## 2024-02-23 DIAGNOSIS — M25.562 CHRONIC PAIN OF BOTH KNEES: ICD-10-CM

## 2024-02-23 DIAGNOSIS — G89.29 CHRONIC PAIN OF BOTH KNEES: ICD-10-CM

## 2024-02-23 DIAGNOSIS — M25.561 CHRONIC PAIN OF BOTH KNEES: ICD-10-CM

## 2024-02-23 PROCEDURE — 1036F TOBACCO NON-USER: CPT | Performed by: FAMILY MEDICINE

## 2024-02-23 PROCEDURE — 3075F SYST BP GE 130 - 139MM HG: CPT | Performed by: FAMILY MEDICINE

## 2024-02-23 PROCEDURE — 99213 OFFICE O/P EST LOW 20 MIN: CPT | Performed by: FAMILY MEDICINE

## 2024-02-23 PROCEDURE — 3008F BODY MASS INDEX DOCD: CPT | Performed by: FAMILY MEDICINE

## 2024-02-23 PROCEDURE — 3078F DIAST BP <80 MM HG: CPT | Performed by: FAMILY MEDICINE

## 2024-02-23 NOTE — LETTER
February 23, 2024     Patient: Neo Ordoñez   YOB: 1960   Date of Visit: 2/23/2024       To Whom It May Concern:    Neo Ordoñez was seen in my clinic on 2/23/2024 for an appointment.  Please excuse from work from 2/23/24 to 5/25/24. May return to work on 5/26/24.    If you have any questions or concerns, please don't hesitate to call.         Sincerely,         Ernesto Romero MD

## 2024-02-23 NOTE — PROGRESS NOTES
"Subjective   Patient ID: Neo Ordoñez \"PAMELA" is a 63 y.o. male who presents for paper work, Leg Pain, Knee Pain, and compartment syndrome .  HPI    Patient presents in office today for long term disability forms   This is short term disability  Patient had diagnosed compartment syndrome 7/3/23, surgery 7/4/23. Right leg  Patient had knee injection 12/18/23 with Dr. Budinsky  Cortisone injection 2/21/24 with Dr. Mckenna.   Patient admits that pain is not getting better in bilateral legs and knees  Patient admits pain today is 4-5/10  Patient admits that meloxicam is not helping    Review of Systems  Constitutional:  no chills, no fever and no night sweats.  Eyes: no blurred vision and no eyesight problems.  ENT: no hearing loss, no nasal congestion, no hoarseness and no sore throat.  Neck: no mass (es) and no swelling.  Cardiovascular: no chest pain, no intermittent leg claudication, no lower extremity edema, no palpitation and no syncope.  Respiratory: no cough, no shortness of breath during exertion, no shortness of breath at rest and no wheezing.  Gastrointestinal: no abdominal pain, no blood in stools, no constipation, no diarrhea, no melena, no nausea, no rectal pain and no vomiting.  Genitourinary: no dysuria, no change in urinary frequency, no urinary hesitancy and no feelings of urinary urgency.  Musculoskeletal: no arthralgias, no back pain and no myalgias.  Integumentary: no new skin lesions and no rashes.  Neurological: no difficulty walking, no headache, no limb weakness, no numbness and no tingling.  Psychiatric/Behavioral: no anxiety, no depression, no anhedonia and no substance use disorders.  Endocrine: no recent weight gain and no recent weight loss.  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  He had spontaneous compartment syndrome develop in the right lower extremity in July of last year requiring emergency decompression surgery he had multiple infections after that surgery scar tissue chronic pain in the lower extremity crepitus and grinding with passive flexion extension of the knee worse on the right but also on the left.  Chronic problems include diabetes hypertension hyperlipidemia.  /60   Pulse 69   Resp 18   Ht 1.753 m (5' 9\")   Wt 96.6 kg (212 lb 14.4 oz)   SpO2 96%   BMI 31.44 kg/m²     Lab Results   Component Value Date    WBC 8.2 11/20/2023    HGB 14.5 11/20/2023    HCT 43.6 11/20/2023    MCV 86 11/20/2023     11/20/2023       Assessment/Plan disability paperwork filled out form he has an  is trying to get disabled from work unable to work at his current occupation.  Problem List Items Addressed This Visit          Musculoskeletal and Injuries    Compartment syndrome (CMS/HCC)     Other Visit Diagnoses       Chronic pain of both knees              "

## 2024-03-01 DIAGNOSIS — I10 HYPERTENSION, UNSPECIFIED TYPE: ICD-10-CM

## 2024-03-01 RX ORDER — AMLODIPINE BESYLATE 10 MG/1
10 TABLET ORAL DAILY
Qty: 90 TABLET | Refills: 1 | Status: SHIPPED | OUTPATIENT
Start: 2024-03-01

## 2024-03-05 ENCOUNTER — APPOINTMENT (OUTPATIENT)
Dept: NEUROLOGY | Facility: HOSPITAL | Age: 64
End: 2024-03-05
Payer: COMMERCIAL

## 2024-03-18 ENCOUNTER — APPOINTMENT (OUTPATIENT)
Dept: ORTHOPEDIC SURGERY | Facility: CLINIC | Age: 64
End: 2024-03-18
Payer: COMMERCIAL

## 2024-04-15 DIAGNOSIS — I10 HYPERTENSION, UNSPECIFIED TYPE: ICD-10-CM

## 2024-04-15 RX ORDER — OLMESARTAN MEDOXOMIL AND HYDROCHLOROTHIAZIDE 40/12.5 40; 12.5 MG/1; MG/1
1 TABLET ORAL DAILY
Qty: 90 TABLET | Refills: 1 | Status: SHIPPED | OUTPATIENT
Start: 2024-04-15

## 2024-04-15 NOTE — TELEPHONE ENCOUNTER
"Rx Refill Request Telephone Encounter    Name:  Neo Ordoñez \"MARCOS\"  : 1960     Medication Name:  BENICAR HCTZ  Dose (Optional):    40-12.5 MG  Quantity (Optional):    90  Directions (Optional):   TAKE DAILY    ALLERGIES:   SEE LIST    Specific Pharmacy location:  [facility]    Date of last appointment:  2024  Date of next appointment:  2024    Best number to reach patient:  [phone]      "

## 2024-05-07 DIAGNOSIS — M19.90 OSTEOARTHRITIS, UNSPECIFIED OSTEOARTHRITIS TYPE, UNSPECIFIED SITE: ICD-10-CM

## 2024-05-07 RX ORDER — MELOXICAM 15 MG/1
15 TABLET ORAL DAILY
Qty: 30 TABLET | Refills: 2 | Status: SHIPPED | OUTPATIENT
Start: 2024-05-07

## 2024-08-02 DIAGNOSIS — M19.90 OSTEOARTHRITIS, UNSPECIFIED OSTEOARTHRITIS TYPE, UNSPECIFIED SITE: ICD-10-CM

## 2024-08-02 RX ORDER — MELOXICAM 15 MG/1
15 TABLET ORAL DAILY
Qty: 30 TABLET | Refills: 0 | Status: SHIPPED | OUTPATIENT
Start: 2024-08-02

## 2024-08-20 NOTE — PROGRESS NOTES
"Subjective   Patient ID: Neo Ordoñez \"PAMELA" is a 63 y.o. male who presents for Diabetes, Hypertension, and Hyperlipidemia.  HPI    Patient presents today for a follow-up on Diabetes, Hypertension, and Hyperlipidemia. Tries to  follow a low sugar, low sodium, and low fat diet. Yes check sugar and Does not  BP at home. Staying active . Blood work 11/20/23.  Does not check glucose at home. . Currently taking Farxiga, Actos, and Ozempic. Last A1c on 8/21/24 @ 5.3 %. Last eye exam last year. Does not see a Podiatrist.    No other concern /question   Review of Systems  Constitutional:  no chills, no fever and no night sweats.  Eyes: no blurred vision and no eyesight problems.  ENT: no hearing loss, no nasal congestion, no hoarseness and no sore throat.  Neck: no mass (es) and no swelling.  Cardiovascular: no chest pain, no intermittent leg claudication, no lower extremity edema, no palpitation and no syncope.  Respiratory: no cough, no shortness of breath during exertion, no shortness of breath at rest and no wheezing.  Gastrointestinal: no abdominal pain, no blood in stools, no constipation, no diarrhea, no melena, no nausea, no rectal pain and no vomiting.  Genitourinary: no dysuria, no change in urinary frequency, no urinary hesitancy and no feelings of urinary urgency.  Musculoskeletal: no arthralgias, no back pain and no myalgias.  Integumentary: no new skin lesions and no rashes.  Neurological: no difficulty walking, no headache, no limb weakness, no numbness and no tingling.  Psychiatric/Behavioral: no anxiety, no depression, no anhedonia and no substance use disorders.  Endocrine: no recent weight gain and no recent weight loss.  Hematologic/Lymphatic: no tendency for easy bruising and no swollen glands    Objective   Physical Exam  Patient in for follow-up diabetes hypertension hyperlipidemia on disability which was approved due to no chronic leg pain and weakness after compartment syndrome and surgery.  " "Does not have insurance she was told he would have to wait 2 years to get insurance that does not make sense I will talk to our local pharmacist of the  service and see what is going on because he is not able to afford Farxiga or Ozempic.  A1c in the office today 5.3 he is on low-dose Ozempic he may not need that and give him some samples of the Farxiga if his he will have to stay on something we will have the pharmacist check and see what is going on and follow-up with the patient he has no other complaints except with her chronic back and leg pain physical exam today's office visit constitutional alert and oriented x3.    Head is atraumatic HEENT is within normal limits.    Neck supple no masses full range of motion.    Thyroid is normal in size no thyromegaly there is no carotid bruits.    Pulmonary exam shows clear to auscultation no respiratory distress.    Cardiovascular shows no murmur rub or gallop.  Regular rate and rhythm.    Abdominal exam soft nontender no hepatosplenomegaly or masses normal bowel sounds no rebound no guarding.    Musculoskeletal exam no joint pain no muscle pain full range of motion.    Psych exam normal mood and affect.    Dermatologic exam no skin lesions no rash no blemishes.    Neuro exam is no focal deficits.  Normal exam.    Extremities no edema normal pulses normal capillary refill.    /82 (BP Location: Left arm, Patient Position: Sitting, BP Cuff Size: Adult)   Temp 36.3 °C (97.4 °F) (Temporal)   Resp 16   Ht 1.753 m (5' 9\")   Wt 92.1 kg (203 lb)   SpO2 98%   BMI 29.98 kg/m²     Lab Results   Component Value Date    WBC 8.2 11/20/2023    HGB 14.5 11/20/2023    HCT 43.6 11/20/2023    MCV 86 11/20/2023     11/20/2023       Assessment/Plan plan continue current treatment hold the Ozempic samples of 10 mg Farxiga given routine recheck in 6 months.  Problem List Items Addressed This Visit          Cardiac and Vasculature    Hyperlipidemia    Hypertension    " Relevant Medications    olmesartan-hydrochlorothiazide (BENIcar HCT) 40-12.5 mg tablet    metoprolol tartrate (Lopressor) 50 mg tablet       Endocrine/Metabolic    Type 2 diabetes mellitus with complication (Multi)    Relevant Orders    POCT glycosylated hemoglobin (Hb A1C) manually resulted (Completed)    BMI 29.0-29.9,adult     Other Visit Diagnoses       Type 2 diabetes mellitus without complication, without long-term current use of insulin (Multi)        Relevant Medications    pioglitazone (Actos) 30 mg tablet    Osteoarthritis, unspecified osteoarthritis type, unspecified site

## 2024-08-21 ENCOUNTER — APPOINTMENT (OUTPATIENT)
Dept: PRIMARY CARE | Facility: CLINIC | Age: 64
End: 2024-08-21
Payer: COMMERCIAL

## 2024-08-21 VITALS
SYSTOLIC BLOOD PRESSURE: 138 MMHG | HEIGHT: 69 IN | BODY MASS INDEX: 30.07 KG/M2 | OXYGEN SATURATION: 98 % | TEMPERATURE: 97.4 F | DIASTOLIC BLOOD PRESSURE: 82 MMHG | RESPIRATION RATE: 16 BRPM | WEIGHT: 203 LBS

## 2024-08-21 DIAGNOSIS — E11.8 TYPE 2 DIABETES MELLITUS WITH COMPLICATION (MULTI): ICD-10-CM

## 2024-08-21 DIAGNOSIS — M19.90 OSTEOARTHRITIS, UNSPECIFIED OSTEOARTHRITIS TYPE, UNSPECIFIED SITE: ICD-10-CM

## 2024-08-21 DIAGNOSIS — E78.5 HYPERLIPIDEMIA, UNSPECIFIED HYPERLIPIDEMIA TYPE: ICD-10-CM

## 2024-08-21 DIAGNOSIS — E11.9 TYPE 2 DIABETES MELLITUS WITHOUT COMPLICATION, WITHOUT LONG-TERM CURRENT USE OF INSULIN (MULTI): ICD-10-CM

## 2024-08-21 DIAGNOSIS — I10 HYPERTENSION, UNSPECIFIED TYPE: ICD-10-CM

## 2024-08-21 LAB — POC HEMOGLOBIN A1C: 5.3 % (ref 4.2–6.5)

## 2024-08-21 PROCEDURE — 99213 OFFICE O/P EST LOW 20 MIN: CPT | Performed by: FAMILY MEDICINE

## 2024-08-21 PROCEDURE — 83036 HEMOGLOBIN GLYCOSYLATED A1C: CPT | Performed by: FAMILY MEDICINE

## 2024-08-21 PROCEDURE — 1036F TOBACCO NON-USER: CPT | Performed by: FAMILY MEDICINE

## 2024-08-21 PROCEDURE — 3075F SYST BP GE 130 - 139MM HG: CPT | Performed by: FAMILY MEDICINE

## 2024-08-21 PROCEDURE — 3079F DIAST BP 80-89 MM HG: CPT | Performed by: FAMILY MEDICINE

## 2024-08-21 PROCEDURE — 3008F BODY MASS INDEX DOCD: CPT | Performed by: FAMILY MEDICINE

## 2024-08-21 RX ORDER — OLMESARTAN MEDOXOMIL AND HYDROCHLOROTHIAZIDE 40/12.5 40; 12.5 MG/1; MG/1
1 TABLET ORAL DAILY
Qty: 90 TABLET | Refills: 1 | Status: SHIPPED | OUTPATIENT
Start: 2024-08-21

## 2024-08-21 RX ORDER — MELOXICAM 15 MG/1
15 TABLET ORAL DAILY
Qty: 90 TABLET | Refills: 1 | Status: SHIPPED | OUTPATIENT
Start: 2024-08-21 | End: 2024-08-21 | Stop reason: WASHOUT

## 2024-08-21 RX ORDER — PIOGLITAZONEHYDROCHLORIDE 30 MG/1
30 TABLET ORAL DAILY
Qty: 90 TABLET | Refills: 1 | Status: SHIPPED | OUTPATIENT
Start: 2024-08-21

## 2024-08-21 RX ORDER — METOPROLOL TARTRATE 50 MG/1
50 TABLET ORAL 2 TIMES DAILY
Qty: 180 TABLET | Refills: 1 | Status: SHIPPED | OUTPATIENT
Start: 2024-08-21

## 2024-08-21 ASSESSMENT — PATIENT HEALTH QUESTIONNAIRE - PHQ9
1. LITTLE INTEREST OR PLEASURE IN DOING THINGS: NOT AT ALL
2. FEELING DOWN, DEPRESSED OR HOPELESS: NOT AT ALL
SUM OF ALL RESPONSES TO PHQ9 QUESTIONS 1 AND 2: 0

## 2024-08-22 ENCOUNTER — TELEPHONE (OUTPATIENT)
Dept: PHARMACY | Facility: HOSPITAL | Age: 64
End: 2024-08-22

## 2024-08-22 NOTE — TELEPHONE ENCOUNTER
Spoke with patient regarding recent loss of insurance.     Instructed patient to reach out to the [facility] resources for assistance.   [facility] Office on Aging [phone]  [facility] [phone]    PharmD to explore other options for cost reduction of diabetes medications.     Patient is to call PharmD back with information. Patient was appreciative and expressed understanding of plan at this time. All questions/concerns answered.     Terri Fischer PharmD, MIKE  Clinical Pharmacist  [phone]  [email or web address]

## 2024-08-26 DIAGNOSIS — I10 HYPERTENSION, UNSPECIFIED TYPE: ICD-10-CM

## 2024-08-27 RX ORDER — AMLODIPINE BESYLATE 10 MG/1
10 TABLET ORAL DAILY
Qty: 30 TABLET | Refills: 5 | Status: SHIPPED | OUTPATIENT
Start: 2024-08-27

## 2024-12-20 DIAGNOSIS — M19.90 OSTEOARTHRITIS, UNSPECIFIED OSTEOARTHRITIS TYPE, UNSPECIFIED SITE: ICD-10-CM

## 2024-12-20 RX ORDER — MELOXICAM 15 MG/1
15 TABLET ORAL DAILY
Qty: 30 TABLET | Refills: 1 | Status: SHIPPED | OUTPATIENT
Start: 2024-12-20 | End: 2025-12-20

## 2024-12-20 NOTE — TELEPHONE ENCOUNTER
"MEDICATION PENDED  Rx Refill Request Telephone Encounter    Name:  Neo Ordoñez \"MARCOS\"  : 1960     Medication Name:  meloxicam  Dose (Optional):    15 mg  Quantity (Optional):    90  Directions (Optional):   take 1 tablet (15 mg) by mouth once daily     ALLERGIES:   see list     Specific Pharmacy location:  [facility]    Date of last appointment:  24  Date of next appointment:  25    Best number to reach patient:  [phone]    "

## 2025-01-09 DIAGNOSIS — G25.0 ESSENTIAL TREMOR: ICD-10-CM

## 2025-01-09 RX ORDER — PRIMIDONE 250 MG/1
250 TABLET ORAL 2 TIMES DAILY
Qty: 180 TABLET | Refills: 3 | Status: SHIPPED | OUTPATIENT
Start: 2025-01-09 | End: 2026-01-09

## 2025-02-14 DIAGNOSIS — E11.9 TYPE 2 DIABETES MELLITUS WITHOUT COMPLICATION, WITHOUT LONG-TERM CURRENT USE OF INSULIN (MULTI): ICD-10-CM

## 2025-02-14 DIAGNOSIS — I10 HYPERTENSION, UNSPECIFIED TYPE: ICD-10-CM

## 2025-02-14 RX ORDER — AMLODIPINE BESYLATE 10 MG/1
10 TABLET ORAL DAILY
Qty: 30 TABLET | Refills: 0 | Status: SHIPPED | OUTPATIENT
Start: 2025-02-14

## 2025-02-14 RX ORDER — OLMESARTAN MEDOXOMIL AND HYDROCHLOROTHIAZIDE 40/12.5 40; 12.5 MG/1; MG/1
1 TABLET ORAL DAILY
Qty: 30 TABLET | Refills: 0 | Status: SHIPPED | OUTPATIENT
Start: 2025-02-14

## 2025-02-14 RX ORDER — PIOGLITAZONEHYDROCHLORIDE 30 MG/1
30 TABLET ORAL DAILY
Qty: 30 TABLET | Refills: 0 | Status: SHIPPED | OUTPATIENT
Start: 2025-02-14

## 2025-02-17 DIAGNOSIS — M19.90 OSTEOARTHRITIS, UNSPECIFIED OSTEOARTHRITIS TYPE, UNSPECIFIED SITE: ICD-10-CM

## 2025-02-17 RX ORDER — MELOXICAM 15 MG/1
15 TABLET ORAL DAILY
Qty: 30 TABLET | Refills: 1 | Status: SHIPPED | OUTPATIENT
Start: 2025-02-17

## 2025-02-24 ENCOUNTER — APPOINTMENT (OUTPATIENT)
Dept: PRIMARY CARE | Facility: CLINIC | Age: 65
End: 2025-02-24

## 2025-02-24 VITALS
SYSTOLIC BLOOD PRESSURE: 126 MMHG | OXYGEN SATURATION: 97 % | TEMPERATURE: 98.1 F | BODY MASS INDEX: 30.27 KG/M2 | DIASTOLIC BLOOD PRESSURE: 76 MMHG | WEIGHT: 204.4 LBS | HEIGHT: 69 IN | HEART RATE: 54 BPM

## 2025-02-24 DIAGNOSIS — E78.5 HYPERLIPIDEMIA, UNSPECIFIED HYPERLIPIDEMIA TYPE: ICD-10-CM

## 2025-02-24 DIAGNOSIS — E11.9 TYPE 2 DIABETES MELLITUS WITHOUT COMPLICATION, WITHOUT LONG-TERM CURRENT USE OF INSULIN (MULTI): ICD-10-CM

## 2025-02-24 DIAGNOSIS — E11.8 TYPE 2 DIABETES MELLITUS WITH COMPLICATION (MULTI): ICD-10-CM

## 2025-02-24 DIAGNOSIS — R39.11 BENIGN PROSTATIC HYPERPLASIA WITH URINARY HESITANCY: ICD-10-CM

## 2025-02-24 DIAGNOSIS — E66.09 CLASS 1 OBESITY DUE TO EXCESS CALORIES WITH SERIOUS COMORBIDITY AND BODY MASS INDEX (BMI) OF 30.0 TO 30.9 IN ADULT: ICD-10-CM

## 2025-02-24 DIAGNOSIS — M19.90 OSTEOARTHRITIS, UNSPECIFIED OSTEOARTHRITIS TYPE, UNSPECIFIED SITE: ICD-10-CM

## 2025-02-24 DIAGNOSIS — N40.1 BENIGN PROSTATIC HYPERPLASIA WITH URINARY HESITANCY: ICD-10-CM

## 2025-02-24 DIAGNOSIS — I10 HYPERTENSION, UNSPECIFIED TYPE: Primary | ICD-10-CM

## 2025-02-24 DIAGNOSIS — G25.0 ESSENTIAL TREMOR: ICD-10-CM

## 2025-02-24 DIAGNOSIS — E66.811 CLASS 1 OBESITY DUE TO EXCESS CALORIES WITH SERIOUS COMORBIDITY AND BODY MASS INDEX (BMI) OF 30.0 TO 30.9 IN ADULT: ICD-10-CM

## 2025-02-24 PROCEDURE — 3078F DIAST BP <80 MM HG: CPT | Performed by: FAMILY MEDICINE

## 2025-02-24 PROCEDURE — 1036F TOBACCO NON-USER: CPT | Performed by: FAMILY MEDICINE

## 2025-02-24 PROCEDURE — 99213 OFFICE O/P EST LOW 20 MIN: CPT | Performed by: FAMILY MEDICINE

## 2025-02-24 PROCEDURE — 3074F SYST BP LT 130 MM HG: CPT | Performed by: FAMILY MEDICINE

## 2025-02-24 PROCEDURE — 3008F BODY MASS INDEX DOCD: CPT | Performed by: FAMILY MEDICINE

## 2025-02-24 RX ORDER — MELOXICAM 15 MG/1
15 TABLET ORAL DAILY
Qty: 90 TABLET | Refills: 1 | Status: SHIPPED | OUTPATIENT
Start: 2025-02-24

## 2025-02-24 RX ORDER — DAPAGLIFLOZIN 10 MG/1
10 TABLET, FILM COATED ORAL DAILY
Qty: 90 TABLET | Refills: 1 | Status: SHIPPED | OUTPATIENT
Start: 2025-02-24

## 2025-02-24 RX ORDER — PRIMIDONE 250 MG/1
250 TABLET ORAL 2 TIMES DAILY
Qty: 180 TABLET | Refills: 1 | Status: SHIPPED | OUTPATIENT
Start: 2025-02-24 | End: 2025-08-23

## 2025-02-24 RX ORDER — AMLODIPINE BESYLATE 10 MG/1
10 TABLET ORAL DAILY
Qty: 90 TABLET | Refills: 1 | Status: SHIPPED | OUTPATIENT
Start: 2025-02-24

## 2025-02-24 RX ORDER — SEMAGLUTIDE 0.68 MG/ML
0.5 INJECTION, SOLUTION SUBCUTANEOUS
Qty: 3 ML | Refills: 1 | Status: SHIPPED | OUTPATIENT
Start: 2025-02-24

## 2025-02-24 RX ORDER — OLMESARTAN MEDOXOMIL AND HYDROCHLOROTHIAZIDE 40/12.5 40; 12.5 MG/1; MG/1
1 TABLET ORAL DAILY
Qty: 90 TABLET | Refills: 1 | Status: SHIPPED | OUTPATIENT
Start: 2025-02-24

## 2025-02-24 RX ORDER — PIOGLITAZONEHYDROCHLORIDE 30 MG/1
30 TABLET ORAL DAILY
Qty: 90 TABLET | Refills: 1 | Status: SHIPPED | OUTPATIENT
Start: 2025-02-24

## 2025-02-24 RX ORDER — METOPROLOL TARTRATE 50 MG/1
50 TABLET ORAL 2 TIMES DAILY
Qty: 180 TABLET | Refills: 1 | Status: SHIPPED | OUTPATIENT
Start: 2025-02-24

## 2025-02-24 ASSESSMENT — PATIENT HEALTH QUESTIONNAIRE - PHQ9
2. FEELING DOWN, DEPRESSED OR HOPELESS: NOT AT ALL
1. LITTLE INTEREST OR PLEASURE IN DOING THINGS: NOT AT ALL
SUM OF ALL RESPONSES TO PHQ9 QUESTIONS 1 AND 2: 0

## 2025-02-24 NOTE — PROGRESS NOTES
"Subjective   Patient ID: Neo Ordoñez \"PAMELA" is a 64 y.o. male who presents for Hypertension, Hyperlipidemia, and Diabetes.  HPI    Patient presents today for a follow-up on Diabetes, Hypertension, and Hyperlipidemia. Does not follow a low sugar, low sodium, and low fat diet. Does not check sugar and/or BP at home. Exercises regularly. Blood work ordered today.    Patient declined the flu vaccine and cologuard today.        Review of Systems  Constitutional:  no chills, no fever and no night sweats.  Eyes: no blurred vision and no eyesight problems.  ENT: no hearing loss, no nasal congestion, no hoarseness and no sore throat.  Neck: no mass (es) and no swelling.  Cardiovascular: no chest pain, no intermittent leg claudication, no lower extremity edema, no palpitation and no syncope.  Respiratory: no cough, no shortness of breath during exertion, no shortness of breath at rest and no wheezing.  Gastrointestinal: no abdominal pain, no blood in stools, no constipation, no diarrhea, no melena, no nausea, no rectal pain and no vomiting.  Genitourinary: no dysuria, no change in urinary frequency, no urinary hesitancy and no feelings of urinary urgency.  Musculoskeletal: no arthralgias, no back pain and no myalgias.  Integumentary: no new skin lesions and no rashes.  Neurological: no difficulty walking, no headache, no limb weakness, no numbness and no tingling.  Psychiatric/Behavioral: no anxiety, no depression, no anhedonia and no substance use disorders.  Endocrine: no recent weight gain and no recent weight loss.  Hematologic/Lymphatic: no tendency for easy bruising and no swollen glands    Objective   Physical Exam  Patient in for follow-up chronic medical problems hypertension hyperlipidemia diabetes stable at present increase stress and brother  recently he had been ill for a long time fighting cancer.  His wife had a heart attack a week ago which she is currently at [facility] thankfully doing well.  " "Supposed be discharged in the next day or so.  She will follow-up here after the discharge.  Well-developed well-nourished mildly obese male in no acute distress lungs clear cardiac and abdominal exams are benign neurologic intact no focal deficits.  /76 (BP Location: Left arm, Patient Position: Sitting)   Pulse 54   Temp 36.7 °C (98.1 °F) (Temporal)   Ht 1.753 m (5' 9\")   Wt 92.7 kg (204 lb 6.4 oz)   SpO2 97%   BMI 30.18 kg/m²     Lab Results   Component Value Date    WBC 8.2 11/20/2023    HGB 14.5 11/20/2023    HCT 43.6 11/20/2023    MCV 86 11/20/2023     11/20/2023       Assessment/Plan plans get blood drawn refill his medication continue current treatment course follow-up when we have the blood test results if doing well routine recheck 6 months.  Problem List Items Addressed This Visit       Benign prostatic hyperplasia with urinary hesitancy    Relevant Orders    Prostate Specific Antigen, Screen    Hyperlipidemia    Relevant Orders    Lipid Panel    Hypertension - Primary    Relevant Orders    CBC and Auto Differential    Comprehensive Metabolic Panel    Type 2 diabetes mellitus with complication (Multi)    Relevant Orders    Hemoglobin A1C    Albumin-Creatinine Ratio, Urine Random     Other Visit Diagnoses       BMI 30.0-30.9,adult        Class 1 obesity due to excess calories with serious comorbidity and body mass index (BMI) of 30.0 to 30.9 in adult        Osteoarthritis, unspecified osteoarthritis type, unspecified site        Type 2 diabetes mellitus without complication, without long-term current use of insulin (Multi)        Essential tremor              "

## 2025-02-27 DIAGNOSIS — E78.5 HYPERLIPIDEMIA, UNSPECIFIED HYPERLIPIDEMIA TYPE: ICD-10-CM

## 2025-02-27 LAB
ALBUMIN SERPL-MCNC: 4.1 G/DL (ref 3.6–5.1)
ALBUMIN/CREAT UR: 25 MG/G CREAT
ALP SERPL-CCNC: 70 U/L (ref 35–144)
ALT SERPL-CCNC: 17 U/L (ref 9–46)
ANION GAP SERPL CALCULATED.4IONS-SCNC: 11 MMOL/L (CALC) (ref 7–17)
AST SERPL-CCNC: 14 U/L (ref 10–35)
BASOPHILS # BLD AUTO: 62 CELLS/UL (ref 0–200)
BASOPHILS NFR BLD AUTO: 1 %
BILIRUB SERPL-MCNC: 0.9 MG/DL (ref 0.2–1.2)
BUN SERPL-MCNC: 20 MG/DL (ref 7–25)
CALCIUM SERPL-MCNC: 8.9 MG/DL (ref 8.6–10.3)
CHLORIDE SERPL-SCNC: 103 MMOL/L (ref 98–110)
CHOLEST SERPL-MCNC: 188 MG/DL
CHOLEST/HDLC SERPL: 4.2 (CALC)
CO2 SERPL-SCNC: 28 MMOL/L (ref 20–32)
CREAT SERPL-MCNC: 1.12 MG/DL (ref 0.7–1.35)
CREAT UR-MCNC: 155 MG/DL (ref 20–320)
EGFRCR SERPLBLD CKD-EPI 2021: 74 ML/MIN/1.73M2
EOSINOPHIL # BLD AUTO: 198 CELLS/UL (ref 15–500)
EOSINOPHIL NFR BLD AUTO: 3.2 %
ERYTHROCYTE [DISTWIDTH] IN BLOOD BY AUTOMATED COUNT: 13.6 % (ref 11–15)
EST. AVERAGE GLUCOSE BLD GHB EST-MCNC: 114 MG/DL
EST. AVERAGE GLUCOSE BLD GHB EST-SCNC: 6.3 MMOL/L
GLUCOSE SERPL-MCNC: 132 MG/DL (ref 65–99)
HBA1C MFR BLD: 5.6 % OF TOTAL HGB
HCT VFR BLD AUTO: 45.2 % (ref 38.5–50)
HDLC SERPL-MCNC: 45 MG/DL
HGB BLD-MCNC: 15.6 G/DL (ref 13.2–17.1)
LDLC SERPL CALC-MCNC: 121 MG/DL (CALC)
LYMPHOCYTES # BLD AUTO: 2046 CELLS/UL (ref 850–3900)
LYMPHOCYTES NFR BLD AUTO: 33 %
MCH RBC QN AUTO: 30.8 PG (ref 27–33)
MCHC RBC AUTO-ENTMCNC: 34.5 G/DL (ref 32–36)
MCV RBC AUTO: 89.2 FL (ref 80–100)
MICROALBUMIN UR-MCNC: 3.9 MG/DL
MONOCYTES # BLD AUTO: 502 CELLS/UL (ref 200–950)
MONOCYTES NFR BLD AUTO: 8.1 %
NEUTROPHILS # BLD AUTO: 3391 CELLS/UL (ref 1500–7800)
NEUTROPHILS NFR BLD AUTO: 54.7 %
NONHDLC SERPL-MCNC: 143 MG/DL (CALC)
PLATELET # BLD AUTO: 135 THOUSAND/UL (ref 140–400)
PMV BLD REES-ECKER: 10.9 FL (ref 7.5–12.5)
POTASSIUM SERPL-SCNC: 3.4 MMOL/L (ref 3.5–5.3)
PROT SERPL-MCNC: 6 G/DL (ref 6.1–8.1)
PSA SERPL-MCNC: 0.77 NG/ML
RBC # BLD AUTO: 5.07 MILLION/UL (ref 4.2–5.8)
SODIUM SERPL-SCNC: 142 MMOL/L (ref 135–146)
TRIGL SERPL-MCNC: 113 MG/DL
WBC # BLD AUTO: 6.2 THOUSAND/UL (ref 3.8–10.8)

## 2025-02-27 RX ORDER — ROSUVASTATIN CALCIUM 10 MG/1
10 TABLET, COATED ORAL DAILY
Qty: 30 TABLET | Refills: 3 | Status: SHIPPED | OUTPATIENT
Start: 2025-02-27 | End: 2026-04-03

## 2025-02-27 NOTE — TELEPHONE ENCOUNTER
----- Message from Ernesto Romero sent at 2/27/2025  8:19 AM EST -----  Good glucose control.  LDL still slightly elevated recommend starting 10 mg of Crestor daily and repeat fasting lipid panel in 4 months.

## 2025-02-27 NOTE — TELEPHONE ENCOUNTER
Message from Ernesto Romero sent at 2/27/2025  8:19 AM EST -----  Good glucose control.  LDL still slightly elevated recommend starting 10 mg of Crestor daily and repeat fasting lipid panel in 4 months.       Patient called back and aware. [facility]

## 2025-03-21 DIAGNOSIS — E11.8 TYPE 2 DIABETES MELLITUS WITH COMPLICATION (MULTI): ICD-10-CM

## 2025-03-21 RX ORDER — DAPAGLIFLOZIN 10 MG/1
10 TABLET, FILM COATED ORAL DAILY
Qty: 90 TABLET | Refills: 1 | Status: SHIPPED | OUTPATIENT
Start: 2025-03-21

## 2025-03-21 NOTE — TELEPHONE ENCOUNTER
----- Message from Terri Fischer sent at 3/20/2025  3:32 PM EDT -----  Regarding: New Script Needed  Hello,    Patient needs new Rx for Farxiga faxed to [phone]. Please include pt ID [id number] on the cover sheet.   Thank you!    Terri   Addended by: ROSI GIMENEZ on: 5/18/2017 01:11 PM     Modules accepted: Orders

## 2025-04-01 DIAGNOSIS — E11.8 TYPE 2 DIABETES MELLITUS WITH COMPLICATION (MULTI): ICD-10-CM

## 2025-04-01 RX ORDER — BLOOD SUGAR DIAGNOSTIC
STRIP MISCELLANEOUS
Qty: 100 STRIP | Refills: 3 | Status: SHIPPED | OUTPATIENT
Start: 2025-04-01

## 2025-04-01 RX ORDER — LANCETS 33 GAUGE
EACH MISCELLANEOUS
Qty: 100 EACH | Refills: 3 | Status: SHIPPED | OUTPATIENT
Start: 2025-04-01

## 2025-06-30 ENCOUNTER — TELEPHONE (OUTPATIENT)
Dept: PRIMARY CARE | Facility: CLINIC | Age: 65
End: 2025-06-30
Payer: COMMERCIAL

## 2025-06-30 DIAGNOSIS — E78.5 HYPERLIPIDEMIA, UNSPECIFIED HYPERLIPIDEMIA TYPE: ICD-10-CM

## 2025-06-30 RX ORDER — ROSUVASTATIN CALCIUM 10 MG/1
10 TABLET, COATED ORAL DAILY
Qty: 90 TABLET | Refills: 0 | Status: SHIPPED | OUTPATIENT
Start: 2025-06-30

## 2025-07-14 ENCOUNTER — TELEPHONE (OUTPATIENT)
Dept: PRIMARY CARE | Facility: CLINIC | Age: 65
End: 2025-07-14
Payer: COMMERCIAL

## 2025-07-14 DIAGNOSIS — E11.8 TYPE 2 DIABETES MELLITUS WITH COMPLICATION (MULTI): ICD-10-CM

## 2025-07-14 NOTE — TELEPHONE ENCOUNTER
Received a fax from [facility] for a missing information notification. (Scanned in)   Please let us know if we need to do anything  Thanks

## 2025-07-14 NOTE — TELEPHONE ENCOUNTER
"Received a fax from [facility] for a missing information notification. (Scanned in)   Please let us know if we need to do anything  Thanks                25  8:03 AM  [facility] contacted Me (Fax)    25  8:04 AM  You routed this conversation to Terri Fischer, PharmD            Thanks!     He would just need a script for Farxiga 10mg faxed to [facility] so they can refill it!    Rx Refill Request Telephone Encounter    Name:  Neo ESTHER DeeOrdoñez \"MARCOS\"  : 1960     Medication Name:  Farxiga  Dose (Optional):    10 mg  Quantity (Optional):    90 tablet (90 day supply)  Directions (Optional):   Take 1 tablet (10 mg) by mouth once daily     ALLERGIES:   see list     Specific Pharmacy location:  [facility]ED[facility] PRINTED TO FAX TO [facility]    Date of last appointment:  25  Date of next appointment:  none     Best number to reach patient:  [phone]    "

## 2025-07-16 RX ORDER — DAPAGLIFLOZIN 10 MG/1
10 TABLET, FILM COATED ORAL DAILY
Qty: 90 TABLET | Refills: 1 | Status: SHIPPED | OUTPATIENT
Start: 2025-07-16

## 2025-08-27 DIAGNOSIS — I10 HYPERTENSION, UNSPECIFIED TYPE: ICD-10-CM

## 2025-08-27 DIAGNOSIS — E11.9 TYPE 2 DIABETES MELLITUS WITHOUT COMPLICATION, WITHOUT LONG-TERM CURRENT USE OF INSULIN: ICD-10-CM

## 2025-08-28 RX ORDER — PIOGLITAZONE 30 MG/1
30 TABLET ORAL DAILY
Qty: 30 TABLET | Refills: 0 | Status: SHIPPED | OUTPATIENT
Start: 2025-08-28

## 2025-08-28 RX ORDER — AMLODIPINE BESYLATE 10 MG/1
10 TABLET ORAL DAILY
Qty: 30 TABLET | Refills: 0 | Status: SHIPPED | OUTPATIENT
Start: 2025-08-28

## 2025-08-28 RX ORDER — OLMESARTAN MEDOXOMIL AND HYDROCHLOROTHIAZIDE 40/12.5 40; 12.5 MG/1; MG/1
1 TABLET ORAL DAILY
Qty: 30 TABLET | Refills: 0 | Status: SHIPPED | OUTPATIENT
Start: 2025-08-28